# Patient Record
Sex: MALE | Race: BLACK OR AFRICAN AMERICAN | NOT HISPANIC OR LATINO | Employment: FULL TIME | URBAN - METROPOLITAN AREA
[De-identification: names, ages, dates, MRNs, and addresses within clinical notes are randomized per-mention and may not be internally consistent; named-entity substitution may affect disease eponyms.]

---

## 2018-02-24 ENCOUNTER — APPOINTMENT (OUTPATIENT)
Dept: CT IMAGING | Facility: HOSPITAL | Age: 23
End: 2018-02-24

## 2018-02-24 ENCOUNTER — HOSPITAL ENCOUNTER (EMERGENCY)
Facility: HOSPITAL | Age: 23
Discharge: HOME | End: 2018-02-24
Attending: EMERGENCY MEDICINE | Admitting: EMERGENCY MEDICINE

## 2018-02-24 VITALS
RESPIRATION RATE: 18 BRPM | DIASTOLIC BLOOD PRESSURE: 89 MMHG | OXYGEN SATURATION: 99 % | HEART RATE: 62 BPM | TEMPERATURE: 98.4 F | BODY MASS INDEX: 24.01 KG/M2 | HEIGHT: 67 IN | WEIGHT: 153 LBS | SYSTOLIC BLOOD PRESSURE: 130 MMHG

## 2018-02-24 DIAGNOSIS — R11.2 NAUSEA VOMITING AND DIARRHEA: ICD-10-CM

## 2018-02-24 DIAGNOSIS — R10.30 LOWER ABDOMINAL PAIN: ICD-10-CM

## 2018-02-24 DIAGNOSIS — K52.9 COLITIS: Primary | ICD-10-CM

## 2018-02-24 DIAGNOSIS — R19.7 NAUSEA VOMITING AND DIARRHEA: ICD-10-CM

## 2018-02-24 LAB
ALBUMIN SERPL-MCNC: 4.5 G/DL (ref 3.2–4.8)
ALBUMIN/GLOB SERPL: 1.3 G/DL (ref 1.5–2.5)
ALP SERPL-CCNC: 65 U/L (ref 25–100)
ALT SERPL W P-5'-P-CCNC: 18 U/L (ref 7–40)
ANION GAP SERPL CALCULATED.3IONS-SCNC: 9 MMOL/L (ref 3–11)
AST SERPL-CCNC: 20 U/L (ref 0–33)
BACTERIA UR QL AUTO: ABNORMAL /HPF
BASOPHILS # BLD MANUAL: 0 10*3/MM3 (ref 0–0.2)
BASOPHILS NFR BLD AUTO: 0 % (ref 0–1)
BILIRUB SERPL-MCNC: 0.6 MG/DL (ref 0.3–1.2)
BILIRUB UR QL STRIP: NEGATIVE
BUN BLD-MCNC: 11 MG/DL (ref 9–23)
BUN/CREAT SERPL: 12.2 (ref 7–25)
CALCIUM SPEC-SCNC: 9.6 MG/DL (ref 8.7–10.4)
CHLORIDE SERPL-SCNC: 100 MMOL/L (ref 99–109)
CLARITY UR: CLEAR
CO2 SERPL-SCNC: 28 MMOL/L (ref 20–31)
COLOR UR: YELLOW
CREAT BLD-MCNC: 0.9 MG/DL (ref 0.6–1.3)
DEPRECATED RDW RBC AUTO: 41.4 FL (ref 37–54)
EOSINOPHIL # BLD MANUAL: 0.11 10*3/MM3 (ref 0.1–0.3)
EOSINOPHIL NFR BLD MANUAL: 1 % (ref 0–3)
ERYTHROCYTE [DISTWIDTH] IN BLOOD BY AUTOMATED COUNT: 12.9 % (ref 11.3–14.5)
FLUAV AG NPH QL: NEGATIVE
FLUBV AG NPH QL IA: NEGATIVE
GFR SERPL CREATININE-BSD FRML MDRD: 130 ML/MIN/1.73
GLOBULIN UR ELPH-MCNC: 3.4 GM/DL
GLUCOSE BLD-MCNC: 111 MG/DL (ref 70–100)
GLUCOSE UR STRIP-MCNC: NEGATIVE MG/DL
HCT VFR BLD AUTO: 45 % (ref 38.9–50.9)
HGB BLD-MCNC: 15.4 G/DL (ref 13.1–17.5)
HGB UR QL STRIP.AUTO: ABNORMAL
HOLD SPECIMEN: NORMAL
HOLD SPECIMEN: NORMAL
HYALINE CASTS UR QL AUTO: ABNORMAL /LPF
KETONES UR QL STRIP: ABNORMAL
LEUKOCYTE ESTERASE UR QL STRIP.AUTO: NEGATIVE
LIPASE SERPL-CCNC: 19 U/L (ref 6–51)
LYMPHOCYTES # BLD MANUAL: 0.46 10*3/MM3 (ref 0.6–4.8)
LYMPHOCYTES NFR BLD MANUAL: 19 % (ref 0–12)
LYMPHOCYTES NFR BLD MANUAL: 4 % (ref 24–44)
MCH RBC QN AUTO: 30.5 PG (ref 27–31)
MCHC RBC AUTO-ENTMCNC: 34.2 G/DL (ref 32–36)
MCV RBC AUTO: 89.1 FL (ref 80–99)
METAMYELOCYTES NFR BLD MANUAL: 1 % (ref 0–0)
MONOCYTES # BLD AUTO: 2.18 10*3/MM3 (ref 0–1)
MYELOCYTES NFR BLD MANUAL: 1 % (ref 0–0)
NEUTROPHILS # BLD AUTO: 8.14 10*3/MM3 (ref 1.5–8.3)
NEUTROPHILS NFR BLD MANUAL: 29 % (ref 41–71)
NEUTS BAND NFR BLD MANUAL: 42 % (ref 0–5)
NITRITE UR QL STRIP: NEGATIVE
PH UR STRIP.AUTO: 6 [PH] (ref 5–8)
PLAT MORPH BLD: NORMAL
PLATELET # BLD AUTO: 221 10*3/MM3 (ref 150–450)
PMV BLD AUTO: 11 FL (ref 6–12)
POTASSIUM BLD-SCNC: 3.7 MMOL/L (ref 3.5–5.5)
PROT SERPL-MCNC: 7.9 G/DL (ref 5.7–8.2)
PROT UR QL STRIP: ABNORMAL
RBC # BLD AUTO: 5.05 10*6/MM3 (ref 4.2–5.76)
RBC # UR: ABNORMAL /HPF
RBC MORPH BLD: NORMAL
REF LAB TEST METHOD: ABNORMAL
SODIUM BLD-SCNC: 137 MMOL/L (ref 132–146)
SP GR UR STRIP: 1.06 (ref 1–1.03)
SQUAMOUS #/AREA URNS HPF: ABNORMAL /HPF
UROBILINOGEN UR QL STRIP: ABNORMAL
VARIANT LYMPHS NFR BLD MANUAL: 3 % (ref 0–5)
WBC MORPH BLD: NORMAL
WBC NRBC COR # BLD: 11.47 10*3/MM3 (ref 4.5–13.5)
WBC UR QL AUTO: ABNORMAL /HPF
WHOLE BLOOD HOLD SPECIMEN: NORMAL
WHOLE BLOOD HOLD SPECIMEN: NORMAL

## 2018-02-24 PROCEDURE — 0 IOPAMIDOL 61 % SOLUTION: Performed by: EMERGENCY MEDICINE

## 2018-02-24 PROCEDURE — 96374 THER/PROPH/DIAG INJ IV PUSH: CPT

## 2018-02-24 PROCEDURE — 96361 HYDRATE IV INFUSION ADD-ON: CPT

## 2018-02-24 PROCEDURE — 25010000002 ONDANSETRON PER 1 MG: Performed by: PHYSICIAN ASSISTANT

## 2018-02-24 PROCEDURE — 25010000002 KETOROLAC TROMETHAMINE PER 15 MG: Performed by: PHYSICIAN ASSISTANT

## 2018-02-24 PROCEDURE — 87804 INFLUENZA ASSAY W/OPTIC: CPT | Performed by: PHYSICIAN ASSISTANT

## 2018-02-24 PROCEDURE — 85007 BL SMEAR W/DIFF WBC COUNT: CPT | Performed by: EMERGENCY MEDICINE

## 2018-02-24 PROCEDURE — 99283 EMERGENCY DEPT VISIT LOW MDM: CPT

## 2018-02-24 PROCEDURE — 74177 CT ABD & PELVIS W/CONTRAST: CPT

## 2018-02-24 PROCEDURE — 83690 ASSAY OF LIPASE: CPT | Performed by: EMERGENCY MEDICINE

## 2018-02-24 PROCEDURE — 80053 COMPREHEN METABOLIC PANEL: CPT | Performed by: EMERGENCY MEDICINE

## 2018-02-24 PROCEDURE — 81001 URINALYSIS AUTO W/SCOPE: CPT | Performed by: EMERGENCY MEDICINE

## 2018-02-24 PROCEDURE — 85025 COMPLETE CBC W/AUTO DIFF WBC: CPT | Performed by: EMERGENCY MEDICINE

## 2018-02-24 PROCEDURE — 96375 TX/PRO/DX INJ NEW DRUG ADDON: CPT

## 2018-02-24 RX ORDER — METHYLPREDNISOLONE 4 MG/1
TABLET ORAL
Qty: 21 TABLET | Refills: 0 | OUTPATIENT
Start: 2018-02-24 | End: 2018-11-17

## 2018-02-24 RX ORDER — FAMOTIDINE 10 MG/ML
20 INJECTION, SOLUTION INTRAVENOUS ONCE
Status: COMPLETED | OUTPATIENT
Start: 2018-02-24 | End: 2018-02-24

## 2018-02-24 RX ORDER — SODIUM CHLORIDE 0.9 % (FLUSH) 0.9 %
10 SYRINGE (ML) INJECTION AS NEEDED
Status: DISCONTINUED | OUTPATIENT
Start: 2018-02-24 | End: 2018-02-24 | Stop reason: HOSPADM

## 2018-02-24 RX ORDER — METRONIDAZOLE 500 MG/1
500 TABLET ORAL 2 TIMES DAILY
Qty: 20 TABLET | Refills: 0 | Status: SHIPPED | OUTPATIENT
Start: 2018-02-24 | End: 2018-03-06

## 2018-02-24 RX ORDER — ONDANSETRON 4 MG/1
4 TABLET, ORALLY DISINTEGRATING ORAL EVERY 6 HOURS PRN
Qty: 15 TABLET | Refills: 0 | OUTPATIENT
Start: 2018-02-24 | End: 2020-09-02

## 2018-02-24 RX ORDER — CIPROFLOXACIN 500 MG/1
500 TABLET, FILM COATED ORAL 2 TIMES DAILY
Qty: 20 TABLET | Refills: 0 | Status: SHIPPED | OUTPATIENT
Start: 2018-02-24 | End: 2018-03-06

## 2018-02-24 RX ORDER — KETOROLAC TROMETHAMINE 30 MG/ML
15 INJECTION, SOLUTION INTRAMUSCULAR; INTRAVENOUS ONCE
Status: COMPLETED | OUTPATIENT
Start: 2018-02-24 | End: 2018-02-24

## 2018-02-24 RX ORDER — DICYCLOMINE HCL 20 MG
20 TABLET ORAL EVERY 6 HOURS PRN
Qty: 20 TABLET | Refills: 0 | Status: SHIPPED | OUTPATIENT
Start: 2018-02-24 | End: 2018-03-01

## 2018-02-24 RX ORDER — ONDANSETRON 2 MG/ML
4 INJECTION INTRAMUSCULAR; INTRAVENOUS ONCE
Status: COMPLETED | OUTPATIENT
Start: 2018-02-24 | End: 2018-02-24

## 2018-02-24 RX ADMIN — SODIUM CHLORIDE 1000 ML: 9 INJECTION, SOLUTION INTRAVENOUS at 13:59

## 2018-02-24 RX ADMIN — KETOROLAC TROMETHAMINE 15 MG: 30 INJECTION, SOLUTION INTRAMUSCULAR at 14:01

## 2018-02-24 RX ADMIN — ONDANSETRON HYDROCHLORIDE 4 MG: 2 INJECTION, SOLUTION INTRAMUSCULAR; INTRAVENOUS at 14:00

## 2018-02-24 RX ADMIN — FAMOTIDINE 20 MG: 10 INJECTION, SOLUTION INTRAVENOUS at 14:03

## 2018-02-24 RX ADMIN — SODIUM CHLORIDE 1000 ML: 9 INJECTION, SOLUTION INTRAVENOUS at 15:26

## 2018-02-24 RX ADMIN — IOPAMIDOL 85 ML: 612 INJECTION, SOLUTION INTRAVENOUS at 14:36

## 2018-02-24 NOTE — ED PROVIDER NOTES
Subjective   HPI Comments: Pt is a 23 yo male presenting to ED with vomiting, diarrhea, and abdominal pain. He explains 3 days ago began with headache, chills, sore throat, congestion, diarrhea and vomiting. The chills and HA resolved but the diarrhea has persisted. He tried Zofran and Imodium with no relief. He was seen at  twice and dx with the Flu but was not given Tamiflu due to his current diarrhea. They also did not test him for the Flu. He is having left lower abdominal cramping. He has had a few episodes of bright red blood in his stool. He denies prior GI hx and denies recent antibiotics. He denies cough or SOB.     Patient is a 22 y.o. male presenting with diarrhea.   History provided by:  Patient  Diarrhea   The primary symptoms include nausea, vomiting and diarrhea. Primary symptoms do not include fever, abdominal pain, dysuria, arthralgias or rash. Episode onset: 3 days ago.   The illness is also significant for chills. The illness does not include constipation or back pain. Associated medical issues do not include inflammatory bowel disease, GERD, liver disease, alcohol abuse, irritable bowel syndrome, hemorrhoids or diverticulitis.       Review of Systems   Constitutional: Positive for chills. Negative for fever.   HENT: Negative for congestion, ear pain, sore throat and trouble swallowing.    Eyes: Negative for pain, redness and visual disturbance.   Respiratory: Negative for cough and shortness of breath.    Cardiovascular: Negative for chest pain and leg swelling.   Gastrointestinal: Positive for diarrhea, nausea and vomiting. Negative for abdominal pain, blood in stool and constipation.   Genitourinary: Negative for difficulty urinating, dysuria and flank pain.   Musculoskeletal: Negative for arthralgias, back pain and joint swelling.   Skin: Negative.  Negative for rash and wound.   Allergic/Immunologic: Negative.    Neurological: Negative for dizziness, syncope, weakness, numbness and  headaches.   Psychiatric/Behavioral: Negative for confusion.   All other systems reviewed and are negative.      History reviewed. No pertinent past medical history.    No Known Allergies    History reviewed. No pertinent surgical history.    History reviewed. No pertinent family history.    Social History     Social History   • Marital status: Single     Spouse name: N/A   • Number of children: N/A   • Years of education: N/A     Social History Main Topics   • Smoking status: Never Smoker   • Smokeless tobacco: None   • Alcohol use No   • Drug use: No   • Sexual activity: Not Asked     Other Topics Concern   • None     Social History Narrative   • None           Objective   Physical Exam   Constitutional: He is oriented to person, place, and time. He appears well-developed and well-nourished.   HENT:   Head: Atraumatic.   Nose: Nose normal.   Eyes: Conjunctivae, EOM and lids are normal. Pupils are equal, round, and reactive to light.   Neck: Normal range of motion. Neck supple.   Cardiovascular: Normal rate, regular rhythm and normal heart sounds.    Pulmonary/Chest: Effort normal and breath sounds normal.   Abdominal: Soft. He exhibits no distension. There is tenderness in the left lower quadrant. There is no rebound, no guarding and no CVA tenderness.   Musculoskeletal: Normal range of motion. He exhibits no edema, tenderness or deformity.   Neurological: He is alert and oriented to person, place, and time. He has normal strength. No sensory deficit.   Skin: Skin is warm, dry and intact.   Psychiatric: He has a normal mood and affect. His behavior is normal.   Nursing note and vitals reviewed.      Procedures         ED Course  ED Course      Pt feeling much better after treatment in ED. Discussed results and treatment plan. Will cover with Cipro / Flagyl and short course of steroids due to the colitis and inflammation. Discussed close f/u with GI for further management. He is agreeable with plan. He will return  to ED if new or worse sx.     Recent Results (from the past 24 hour(s))   Comprehensive Metabolic Panel    Collection Time: 02/24/18  1:17 PM   Result Value Ref Range    Glucose 111 (H) 70 - 100 mg/dL    BUN 11 9 - 23 mg/dL    Creatinine 0.90 0.60 - 1.30 mg/dL    Sodium 137 132 - 146 mmol/L    Potassium 3.7 3.5 - 5.5 mmol/L    Chloride 100 99 - 109 mmol/L    CO2 28.0 20.0 - 31.0 mmol/L    Calcium 9.6 8.7 - 10.4 mg/dL    Total Protein 7.9 5.7 - 8.2 g/dL    Albumin 4.50 3.20 - 4.80 g/dL    ALT (SGPT) 18 7 - 40 U/L    AST (SGOT) 20 0 - 33 U/L    Alkaline Phosphatase 65 25 - 100 U/L    Total Bilirubin 0.6 0.3 - 1.2 mg/dL    eGFR  African Amer 130 >60 mL/min/1.73    Globulin 3.4 gm/dL    A/G Ratio 1.3 (L) 1.5 - 2.5 g/dL    BUN/Creatinine Ratio 12.2 7.0 - 25.0    Anion Gap 9.0 3.0 - 11.0 mmol/L   Lipase    Collection Time: 02/24/18  1:17 PM   Result Value Ref Range    Lipase 19 6 - 51 U/L   Light Blue Top    Collection Time: 02/24/18  1:17 PM   Result Value Ref Range    Extra Tube hold for add-on    Green Top (Gel)    Collection Time: 02/24/18  1:17 PM   Result Value Ref Range    Extra Tube Hold for add-ons.    Lavender Top    Collection Time: 02/24/18  1:17 PM   Result Value Ref Range    Extra Tube hold for add-on    Gold Top - SST    Collection Time: 02/24/18  1:17 PM   Result Value Ref Range    Extra Tube Hold for add-ons.    CBC Auto Differential    Collection Time: 02/24/18  1:17 PM   Result Value Ref Range    WBC 11.47 4.50 - 13.50 10*3/mm3    RBC 5.05 4.20 - 5.76 10*6/mm3    Hemoglobin 15.4 13.1 - 17.5 g/dL    Hematocrit 45.0 38.9 - 50.9 %    MCV 89.1 80.0 - 99.0 fL    MCH 30.5 27.0 - 31.0 pg    MCHC 34.2 32.0 - 36.0 g/dL    RDW 12.9 11.3 - 14.5 %    RDW-SD 41.4 37.0 - 54.0 fl    MPV 11.0 6.0 - 12.0 fL    Platelets 221 150 - 450 10*3/mm3   Manual Differential    Collection Time: 02/24/18  1:17 PM   Result Value Ref Range    Neutrophil % 29.0 (L) 41.0 - 71.0 %    Lymphocyte % 4.0 (L) 24.0 - 44.0 %    Monocyte % 19.0  (H) 0.0 - 12.0 %    Eosinophil % 1.0 0.0 - 3.0 %    Basophil % 0.0 0.0 - 1.0 %    Bands %  42.0 (H) 0.0 - 5.0 %    Metamyelocyte % 1.0 (H) 0.0 - 0.0 %    Myelocyte % 1.0 (H) 0.0 - 0.0 %    Atypical Lymphocyte % 3.0 0.0 - 5.0 %    Neutrophils Absolute 8.14 1.50 - 8.30 10*3/mm3    Lymphocytes Absolute 0.46 (L) 0.60 - 4.80 10*3/mm3    Monocytes Absolute 2.18 (H) 0.00 - 1.00 10*3/mm3    Eosinophils Absolute 0.11 0.10 - 0.30 10*3/mm3    Basophils Absolute 0.00 0.00 - 0.20 10*3/mm3    RBC Morphology Normal Normal    WBC Morphology Normal Normal    Platelet Morphology Normal Normal   Influenza Antigen, Rapid - Swab, Nasopharynx    Collection Time: 02/24/18  2:04 PM   Result Value Ref Range    Influenza A Ag, EIA Negative Negative    Influenza B Ag, EIA Negative Negative   Urinalysis With / Culture If Indicated - Urine, Clean Catch    Collection Time: 02/24/18  3:21 PM   Result Value Ref Range    Color, UA Yellow Yellow, Straw    Appearance, UA Clear Clear    pH, UA 6.0 5.0 - 8.0    Specific Gravity, UA 1.059 (H) 1.001 - 1.030    Glucose, UA Negative Negative    Ketones, UA 40 mg/dL (2+) (A) Negative    Bilirubin, UA Negative Negative    Blood, UA Trace (A) Negative    Protein, UA Trace (A) Negative    Leuk Esterase, UA Negative Negative    Nitrite, UA Negative Negative    Urobilinogen, UA 0.2 E.U./dL 0.2 - 1.0 E.U./dL   Urinalysis, Microscopic Only - Urine, Clean Catch    Collection Time: 02/24/18  3:21 PM   Result Value Ref Range    RBC, UA 3-6 (A) None Seen, 0-2 /HPF    WBC, UA None Seen None Seen, 0-2 /HPF    Bacteria, UA None Seen None Seen, Trace /HPF    Squamous Epithelial Cells, UA 0-2 None Seen, 0-2 /HPF    Hyaline Casts, UA 0-6 0 - 6 /LPF    Methodology Manual Light Microscopy      Note: In addition to lab results from this visit, the labs listed above may include labs taken at another facility or during a different encounter within the last 24 hours. Please correlate lab times with ED admission and discharge  "times for further clarification of the services performed during this visit.    CT Abdomen Pelvis With Contrast   Final Result   Circumferential mural thickening of the distal transverse   colon through the rectum in a fairly noninterrupted appearance   consistent with colitis. Given long segment involvement with adjacent   inflammation, considerations for infectious or inflammatory etiology   with ulcerative colitis top consideration. No associated abscess   formation or perforation.       DICTATED:     02/24/2018   EDITED    :     02/24/2018        This report was finalized on 2/24/2018 3:51 PM by Dr. Pete Nuñez.            Vitals:    02/24/18 1313   BP: (!) 150/105   Pulse: 60   Resp: 16   Temp: 98.5 °F (36.9 °C)   TempSrc: Oral   SpO2: 99%   Weight: 69.4 kg (153 lb)   Height: 170.2 cm (67\")     Medications   sodium chloride 0.9 % flush 10 mL (not administered)   sodium chloride 0.9 % bolus 1,000 mL (0 mL Intravenous Stopped 2/24/18 1504)   ondansetron (ZOFRAN) injection 4 mg (4 mg Intravenous Given 2/24/18 1400)   famotidine (PEPCID) injection 20 mg (20 mg Intravenous Given 2/24/18 1403)   ketorolac (TORADOL) injection 15 mg (15 mg Intravenous Given 2/24/18 1401)   iopamidol (ISOVUE-300) 61 % injection 85 mL (85 mL Intravenous Given 2/24/18 1436)   sodium chloride 0.9 % bolus 1,000 mL (1,000 mL Intravenous New Bag 2/24/18 1526)                      MDM    Final diagnoses:   Colitis   Nausea vomiting and diarrhea   Lower abdominal pain            CORIN Valadez  02/24/18 1901    "

## 2018-02-24 NOTE — DISCHARGE INSTRUCTIONS
Follow up with one of the [facility] physician groups below to setup primary care. If you have trouble following up, please call Anne James, our transitional care nurse, at [phone].    (Dr. Gallo, Dr. Marks, Dr. Lomeli, and Dr. Brooks.)  [facility], Primary Care, [phone], [address], [place] [address]    [facility], Primary Care, [phone], [address], [address] [place], [address] [facility]) [facility], Primary Care, [phone], [address] [place], [address] [facility], Primary Care, [phone], [address], [address] [place], [address]     [place] [address] [facility], Primary Care, [phone], [address], [address] [place], [address]    [place] [address] [facility], Primary Care, [phone], [address]    [place], [address] [facility], Primary Care, [phone], [address], [address] [place], [address] [facility], Primary Care, [phone], [address], [address] [place], [address] [facility]) [facility], Primary Care, [phone], [address], [address] [place], [address] [facility], Primary Care, [phone], [address], [address] [place], [address] [facility], Primary Care, [phone], [address], [place], [address]     [facility], Primary Care, [phone], [address] [id number]

## 2018-05-04 ENCOUNTER — HOSPITAL ENCOUNTER (EMERGENCY)
Facility: HOSPITAL | Age: 23
Discharge: HOME | End: 2018-05-05
Attending: EMERGENCY MEDICINE | Admitting: EMERGENCY MEDICINE

## 2018-05-04 VITALS
HEIGHT: 67 IN | WEIGHT: 149 LBS | TEMPERATURE: 98.7 F | RESPIRATION RATE: 16 BRPM | OXYGEN SATURATION: 97 % | SYSTOLIC BLOOD PRESSURE: 122 MMHG | DIASTOLIC BLOOD PRESSURE: 82 MMHG | BODY MASS INDEX: 23.39 KG/M2 | HEART RATE: 69 BPM

## 2018-05-04 DIAGNOSIS — R11.2 NON-INTRACTABLE VOMITING WITH NAUSEA, UNSPECIFIED VOMITING TYPE: ICD-10-CM

## 2018-05-04 DIAGNOSIS — R10.84 GENERALIZED ABDOMINAL PAIN: Primary | ICD-10-CM

## 2018-05-04 LAB
ALBUMIN SERPL-MCNC: 4.3 G/DL (ref 3.2–4.8)
ALBUMIN/GLOB SERPL: 1.4 G/DL (ref 1.5–2.5)
ALP SERPL-CCNC: 76 U/L (ref 25–100)
ALT SERPL W P-5'-P-CCNC: 15 U/L (ref 7–40)
ANION GAP SERPL CALCULATED.3IONS-SCNC: 5 MMOL/L (ref 3–11)
AST SERPL-CCNC: 19 U/L (ref 0–33)
BASOPHILS # BLD AUTO: 0.02 10*3/MM3 (ref 0–0.2)
BASOPHILS NFR BLD AUTO: 0.2 % (ref 0–1)
BILIRUB SERPL-MCNC: 0.9 MG/DL (ref 0.3–1.2)
BUN BLD-MCNC: 10 MG/DL (ref 9–23)
BUN/CREAT SERPL: 12.5 (ref 7–25)
CALCIUM SPEC-SCNC: 8.9 MG/DL (ref 8.7–10.4)
CHLORIDE SERPL-SCNC: 111 MMOL/L (ref 99–109)
CO2 SERPL-SCNC: 25 MMOL/L (ref 20–31)
CREAT BLD-MCNC: 0.8 MG/DL (ref 0.6–1.3)
DEPRECATED RDW RBC AUTO: 44.3 FL (ref 37–54)
EOSINOPHIL # BLD AUTO: 0.27 10*3/MM3 (ref 0–0.3)
EOSINOPHIL NFR BLD AUTO: 3.2 % (ref 0–3)
ERYTHROCYTE [DISTWIDTH] IN BLOOD BY AUTOMATED COUNT: 13.6 % (ref 11.3–14.5)
GFR SERPL CREATININE-BSD FRML MDRD: 149 ML/MIN/1.73
GLOBULIN UR ELPH-MCNC: 3 GM/DL
GLUCOSE BLD-MCNC: 84 MG/DL (ref 70–100)
HCT VFR BLD AUTO: 41.9 % (ref 38.9–50.9)
HGB BLD-MCNC: 13.9 G/DL (ref 13.1–17.5)
HOLD SPECIMEN: NORMAL
HOLD SPECIMEN: NORMAL
IMM GRANULOCYTES # BLD: 0.02 10*3/MM3 (ref 0–0.03)
IMM GRANULOCYTES NFR BLD: 0.2 % (ref 0–0.6)
LIPASE SERPL-CCNC: 28 U/L (ref 6–51)
LYMPHOCYTES # BLD AUTO: 1.81 10*3/MM3 (ref 0.6–4.8)
LYMPHOCYTES NFR BLD AUTO: 21.1 % (ref 24–44)
MCH RBC QN AUTO: 29.7 PG (ref 27–31)
MCHC RBC AUTO-ENTMCNC: 33.2 G/DL (ref 32–36)
MCV RBC AUTO: 89.5 FL (ref 80–99)
MONOCYTES # BLD AUTO: 1.04 10*3/MM3 (ref 0–1)
MONOCYTES NFR BLD AUTO: 12.1 % (ref 0–12)
NEUTROPHILS # BLD AUTO: 5.42 10*3/MM3 (ref 1.5–8.3)
NEUTROPHILS NFR BLD AUTO: 63.4 % (ref 41–71)
PLATELET # BLD AUTO: 251 10*3/MM3 (ref 150–450)
PMV BLD AUTO: 10.5 FL (ref 6–12)
POTASSIUM BLD-SCNC: 3.4 MMOL/L (ref 3.5–5.5)
PROT SERPL-MCNC: 7.3 G/DL (ref 5.7–8.2)
RBC # BLD AUTO: 4.68 10*6/MM3 (ref 4.2–5.76)
SODIUM BLD-SCNC: 141 MMOL/L (ref 132–146)
WBC NRBC COR # BLD: 8.56 10*3/MM3 (ref 4.5–13.5)
WHOLE BLOOD HOLD SPECIMEN: NORMAL
WHOLE BLOOD HOLD SPECIMEN: NORMAL

## 2018-05-04 PROCEDURE — 85025 COMPLETE CBC W/AUTO DIFF WBC: CPT

## 2018-05-04 PROCEDURE — 80053 COMPREHEN METABOLIC PANEL: CPT

## 2018-05-04 PROCEDURE — 99283 EMERGENCY DEPT VISIT LOW MDM: CPT

## 2018-05-04 PROCEDURE — 83690 ASSAY OF LIPASE: CPT

## 2018-05-04 PROCEDURE — 81003 URINALYSIS AUTO W/O SCOPE: CPT | Performed by: EMERGENCY MEDICINE

## 2018-05-04 PROCEDURE — 96360 HYDRATION IV INFUSION INIT: CPT

## 2018-05-04 RX ORDER — SODIUM CHLORIDE 0.9 % (FLUSH) 0.9 %
10 SYRINGE (ML) INJECTION AS NEEDED
Status: DISCONTINUED | OUTPATIENT
Start: 2018-05-04 | End: 2018-05-05 | Stop reason: HOSPADM

## 2018-05-05 ENCOUNTER — APPOINTMENT (OUTPATIENT)
Dept: CT IMAGING | Facility: HOSPITAL | Age: 23
End: 2018-05-05

## 2018-05-05 LAB
BILIRUB UR QL STRIP: NEGATIVE
CLARITY UR: CLEAR
COLOR UR: YELLOW
GLUCOSE UR STRIP-MCNC: NEGATIVE MG/DL
HGB UR QL STRIP.AUTO: NEGATIVE
KETONES UR QL STRIP: ABNORMAL
LEUKOCYTE ESTERASE UR QL STRIP.AUTO: NEGATIVE
NITRITE UR QL STRIP: NEGATIVE
PH UR STRIP.AUTO: 5.5 [PH] (ref 5–8)
PROT UR QL STRIP: NEGATIVE
SP GR UR STRIP: >=1.03 (ref 1–1.03)
UROBILINOGEN UR QL STRIP: ABNORMAL

## 2018-05-05 PROCEDURE — 74177 CT ABD & PELVIS W/CONTRAST: CPT

## 2018-05-05 PROCEDURE — 96360 HYDRATION IV INFUSION INIT: CPT

## 2018-05-05 PROCEDURE — 25010000002 IOPAMIDOL 61 % SOLUTION: Performed by: EMERGENCY MEDICINE

## 2018-05-05 RX ORDER — DICYCLOMINE HCL 20 MG
20 TABLET ORAL EVERY 6 HOURS PRN
Qty: 12 TABLET | Refills: 0 | OUTPATIENT
Start: 2018-05-05 | End: 2021-01-19

## 2018-05-05 RX ORDER — OMEPRAZOLE 40 MG/1
40 CAPSULE, DELAYED RELEASE ORAL DAILY
Qty: 30 CAPSULE | Refills: 0 | Status: SHIPPED | OUTPATIENT
Start: 2018-05-05

## 2018-05-05 RX ADMIN — SODIUM CHLORIDE 1000 ML: 9 INJECTION, SOLUTION INTRAVENOUS at 00:17

## 2018-05-05 RX ADMIN — IOPAMIDOL 85 ML: 612 INJECTION, SOLUTION INTRAVENOUS at 00:34

## 2018-05-05 NOTE — ED PROVIDER NOTES
"Subjective   Mr. Willy Vu is a 22 year old male who presents to the ED with c/o abd pain. The patient reports that he has experienced pain in his mid-lower abdomen for the past 4 days. He describes the sensation as a \"fullness\" and notes that it does not radiate. He denies any associated fever, chills, or urinary symptoms. Had a normal BM this morning. Vomited once today. Of note, patient was diagnosed with syphilis 2 weeks ago by his primary care physician. Had treatment a week ago and his symptoms have since resolved. All other STIs negative. Has had no sexual activity since then. Additionally, he has a colonoscopy scheduled in June for severe abd pain.        History provided by:  Patient  Abdominal Pain   Pain location:  Periumbilical  Pain quality: fullness    Pain radiates to:  Does not radiate  Pain severity:  Moderate  Duration:  4 days  Timing:  Constant  Progression:  Unchanged  Chronicity:  New  Relieved by:  Nothing  Worsened by:  Nothing  Associated symptoms: vomiting    Associated symptoms: no chills, no constipation, no diarrhea, no dysuria, no fever and no hematuria    Vomiting:     Number of occurrences:  1  Risk factors: not elderly and not obese    Risk factors comment:  Hx of STI      Review of Systems   Constitutional: Negative for chills and fever.   Gastrointestinal: Positive for abdominal pain and vomiting. Negative for blood in stool, constipation and diarrhea.   Genitourinary: Negative.  Negative for decreased urine volume, difficulty urinating, dysuria, frequency, hematuria and urgency.   All other systems reviewed and are negative.      No past medical history on file.    No Known Allergies    No past surgical history on file.    No family history on file.    Social History     Social History   • Marital status: Single     Social History Main Topics   • Smoking status: Never Smoker   • Alcohol use No   • Drug use: No     Other Topics Concern   • Not on file         Objective "   Physical Exam   Constitutional: He is oriented to person, place, and time. He appears well-developed and well-nourished. No distress.   HENT:   Head: Normocephalic and atraumatic.   Eyes: Conjunctivae are normal. No scleral icterus.   Neck: Normal range of motion. Neck supple.   Cardiovascular: Normal rate, regular rhythm and normal heart sounds.  Exam reveals no gallop and no friction rub.    No murmur heard.  Pulmonary/Chest: Effort normal and breath sounds normal. No respiratory distress. He has no wheezes. He has no rales.   Abdominal: Soft. Bowel sounds are normal. There is tenderness. There is no rebound and no guarding.   Mild generalized upper abdominal tenderness. Mild RLQ tenderness. No guarding. No rebound.   Musculoskeletal: Normal range of motion.   Neurological: He is alert and oriented to person, place, and time.   Skin: Skin is warm and dry. He is not diaphoretic.   Psychiatric: He has a normal mood and affect. His behavior is normal.   Nursing note and vitals reviewed.      Procedures         ED Course  ED Course   Value Comment By Time   Monocytes, Absolute: (!) 1.04 (Reviewed) Mau Almanzar DO 05/04 4871    I reviewed imaging and laboratory studies with the patient.  In discussing treatment plan, patient states that he remembers that he started taking the medication just before his abdominal pain symptoms began.  He is unsure exactly what the name is but he states that it was a prophylactic HIV medication that he just started taking.  He states that he does not have HIV, but his boyfriend has HIV, and he has been prescribed this medicine occasion prophylactically. Mau Almanzar DO 05/05 6886       Recent Results (from the past 24 hour(s))   Comprehensive Metabolic Panel    Collection Time: 05/04/18 10:12 PM   Result Value Ref Range    Glucose 84 70 - 100 mg/dL    BUN 10 9 - 23 mg/dL    Creatinine 0.80 0.60 - 1.30 mg/dL    Sodium 141 132 - 146 mmol/L    Potassium 3.4 (L) 3.5 - 5.5 mmol/L    Chloride  111 (H) 99 - 109 mmol/L    CO2 25.0 20.0 - 31.0 mmol/L    Calcium 8.9 8.7 - 10.4 mg/dL    Total Protein 7.3 5.7 - 8.2 g/dL    Albumin 4.30 3.20 - 4.80 g/dL    ALT (SGPT) 15 7 - 40 U/L    AST (SGOT) 19 0 - 33 U/L    Alkaline Phosphatase 76 25 - 100 U/L    Total Bilirubin 0.9 0.3 - 1.2 mg/dL    eGFR  African Amer 149 >60 mL/min/1.73    Globulin 3.0 gm/dL    A/G Ratio 1.4 (L) 1.5 - 2.5 g/dL    BUN/Creatinine Ratio 12.5 7.0 - 25.0    Anion Gap 5.0 3.0 - 11.0 mmol/L   Lipase    Collection Time: 05/04/18 10:12 PM   Result Value Ref Range    Lipase 28 6 - 51 U/L   Light Blue Top    Collection Time: 05/04/18 10:12 PM   Result Value Ref Range    Extra Tube hold for add-on    Green Top (Gel)    Collection Time: 05/04/18 10:12 PM   Result Value Ref Range    Extra Tube Hold for add-ons.    Lavender Top    Collection Time: 05/04/18 10:12 PM   Result Value Ref Range    Extra Tube hold for add-on    Gold Top - SST    Collection Time: 05/04/18 10:12 PM   Result Value Ref Range    Extra Tube Hold for add-ons.    CBC Auto Differential    Collection Time: 05/04/18 10:12 PM   Result Value Ref Range    WBC 8.56 4.50 - 13.50 10*3/mm3    RBC 4.68 4.20 - 5.76 10*6/mm3    Hemoglobin 13.9 13.1 - 17.5 g/dL    Hematocrit 41.9 38.9 - 50.9 %    MCV 89.5 80.0 - 99.0 fL    MCH 29.7 27.0 - 31.0 pg    MCHC 33.2 32.0 - 36.0 g/dL    RDW 13.6 11.3 - 14.5 %    RDW-SD 44.3 37.0 - 54.0 fl    MPV 10.5 6.0 - 12.0 fL    Platelets 251 150 - 450 10*3/mm3    Neutrophil % 63.4 41.0 - 71.0 %    Lymphocyte % 21.1 (L) 24.0 - 44.0 %    Monocyte % 12.1 (H) 0.0 - 12.0 %    Eosinophil % 3.2 (H) 0.0 - 3.0 %    Basophil % 0.2 0.0 - 1.0 %    Immature Grans % 0.2 0.0 - 0.6 %    Neutrophils, Absolute 5.42 1.50 - 8.30 10*3/mm3    Lymphocytes, Absolute 1.81 0.60 - 4.80 10*3/mm3    Monocytes, Absolute 1.04 (H) 0.00 - 1.00 10*3/mm3    Eosinophils, Absolute 0.27 0.00 - 0.30 10*3/mm3    Basophils, Absolute 0.02 0.00 - 0.20 10*3/mm3    Immature Grans, Absolute 0.02 0.00 - 0.03  "10*3/mm3     Note: In addition to lab results from this visit, the labs listed above may include labs taken at another facility or during a different encounter within the last 24 hours. Please correlate lab times with ED admission and discharge times for further clarification of the services performed during this visit.    CT Abdomen Pelvis With Contrast    (Results Pending)     Vitals:    05/04/18 2211   BP: 122/82   BP Location: Left arm   Patient Position: Sitting   Pulse: 69   Resp: 16   Temp: 98.7 °F (37.1 °C)   TempSrc: Oral   SpO2: 97%   Weight: 67.6 kg (149 lb)   Height: 170.2 cm (67\")     Medications   sodium chloride 0.9 % flush 10 mL (not administered)   sodium chloride 0.9 % bolus 1,000 mL (not administered)     ECG/EMG Results (last 24 hours)     ** No results found for the last 24 hours. **        Upon further discussion, pt states that 1-2 days before his abdominal pain began, he started taking an HIV prophylaxis medication.  He denies that he has a history of HIV, but his boyfriend has HIV, and thus pt has been taking a prophylactic antiviral medication of which he does not know the name.  Given the timing of his symptoms, I suspect that this new medication could be contributing to his abdominal pain.  He will discuss this with his PCP, the prescribing physician.               MDM    Final diagnoses:   Generalized abdominal pain   Non-intractable vomiting with nausea, unspecified vomiting type       Documentation assistance provided by keyla Hernández.  Information recorded by the scribe was done at my direction and has been verified and validated by me.     Tima Hernández  05/04/18 5090       Mau Almanzar DO  05/05/18 3935    "

## 2018-11-17 ENCOUNTER — HOSPITAL ENCOUNTER (EMERGENCY)
Facility: HOSPITAL | Age: 23
Discharge: HOME | End: 2018-11-17
Attending: EMERGENCY MEDICINE | Admitting: EMERGENCY MEDICINE

## 2018-11-17 VITALS
DIASTOLIC BLOOD PRESSURE: 72 MMHG | BODY MASS INDEX: 22.76 KG/M2 | RESPIRATION RATE: 16 BRPM | SYSTOLIC BLOOD PRESSURE: 120 MMHG | OXYGEN SATURATION: 98 % | HEIGHT: 67 IN | WEIGHT: 145 LBS | TEMPERATURE: 98.2 F | HEART RATE: 60 BPM

## 2018-11-17 DIAGNOSIS — N34.2 URETHRITIS: Primary | ICD-10-CM

## 2018-11-17 PROCEDURE — 86592 SYPHILIS TEST NON-TREP QUAL: CPT | Performed by: EMERGENCY MEDICINE

## 2018-11-17 PROCEDURE — 96372 THER/PROPH/DIAG INJ SC/IM: CPT

## 2018-11-17 PROCEDURE — 87491 CHLMYD TRACH DNA AMP PROBE: CPT | Performed by: EMERGENCY MEDICINE

## 2018-11-17 PROCEDURE — 25010000002 CEFTRIAXONE PER 250 MG: Performed by: EMERGENCY MEDICINE

## 2018-11-17 PROCEDURE — 99283 EMERGENCY DEPT VISIT LOW MDM: CPT

## 2018-11-17 PROCEDURE — 87591 N.GONORRHOEAE DNA AMP PROB: CPT | Performed by: EMERGENCY MEDICINE

## 2018-11-17 RX ORDER — LIDOCAINE HYDROCHLORIDE 10 MG/ML
0.9 INJECTION, SOLUTION EPIDURAL; INFILTRATION; INTRACAUDAL; PERINEURAL ONCE
Status: COMPLETED | OUTPATIENT
Start: 2018-11-17 | End: 2018-11-17

## 2018-11-17 RX ORDER — CEFTRIAXONE SODIUM 250 MG/1
250 INJECTION, POWDER, FOR SOLUTION INTRAMUSCULAR; INTRAVENOUS ONCE
Status: COMPLETED | OUTPATIENT
Start: 2018-11-17 | End: 2018-11-17

## 2018-11-17 RX ORDER — AZITHROMYCIN 250 MG/1
1000 TABLET, FILM COATED ORAL ONCE
Status: COMPLETED | OUTPATIENT
Start: 2018-11-17 | End: 2018-11-17

## 2018-11-17 RX ORDER — ONDANSETRON 4 MG/1
4 TABLET, ORALLY DISINTEGRATING ORAL ONCE
Status: COMPLETED | OUTPATIENT
Start: 2018-11-17 | End: 2018-11-17

## 2018-11-17 RX ADMIN — LIDOCAINE HYDROCHLORIDE 0.9 ML: 10 INJECTION, SOLUTION EPIDURAL; INFILTRATION; INTRACAUDAL; PERINEURAL at 01:52

## 2018-11-17 RX ADMIN — CEFTRIAXONE SODIUM 250 MG: 250 INJECTION, POWDER, FOR SOLUTION INTRAMUSCULAR; INTRAVENOUS at 01:52

## 2018-11-17 RX ADMIN — AZITHROMYCIN 1000 MG: 250 TABLET, FILM COATED ORAL at 01:49

## 2018-11-17 RX ADMIN — ONDANSETRON 4 MG: 4 TABLET, ORALLY DISINTEGRATING ORAL at 01:50

## 2018-11-17 NOTE — DISCHARGE INSTRUCTIONS
No sexual contact until this infection is fully treated and diagnosed.  Your doctor will give you further guidance on reevaluation this coming week    You've been treated with ceftriaxone and azithromycin here in the emergency department.  An RPR which is a syphilis test was done, will but will take several days to receive results.    If she results indicate you have an STD your partner will absolutely need to be treated.  Your partner needs to be evaluated regardless based on your symptoms by his regular physician    Follow-up with your doctor this coming week for HIV testing, and have her obtain the results of your RPR here as well as if it's positive you'll need additional different antibiotics.    Return to the emergency department if you have any acute, urgent, emergent, or progressive symptoms as discussed.  Follow-up with urology as needed in consultation with your primary physician if you have continued symptoms

## 2018-11-17 NOTE — ED PROVIDER NOTES
Subjective   22-year-old male presents to emergency department with urethral pain and discharge    Patient reports onset of purulent urethral discharge this afternoon along with dysuria without frequency or urgency.  He's had no STDs in the past.  He reports that his male sexual partner is HIV positive with a history of gonorrhea and chlamydia in the past.  He reports he's had no other contacts other than his significant other for the last 6 months.  He denies any inguinal pain or adenopathy.  He denies any abdominal pain nausea vomiting diarrhea.  He has no sore throat or pharyngeal symptoms.  He denies any skin rash.  He has no cough congestion URI symptoms and denies any pain other than urethral.  He gets checked regularly for HIV, and all checks have been negative.          History provided by:  Patient  Illness   Location:  Penis  Quality:  Burning and discharge  Severity:  Moderate  Onset quality:  Sudden  Timing:  Constant  Progression:  Worsening  Chronicity:  New  Relieved by:  Nothing  Worsened by:  Voiding  Associated symptoms: no abdominal pain, no congestion, no cough, no diarrhea, no ear pain, no fatigue, no fever, no headaches, no rash, no rhinorrhea, no sore throat and no vomiting    Risk factors:  Sexually active      Review of Systems   Constitutional: Negative.  Negative for fatigue and fever.   HENT: Negative.  Negative for congestion, ear pain, rhinorrhea and sore throat.    Eyes: Negative.    Respiratory: Negative.  Negative for cough.    Cardiovascular: Negative.    Gastrointestinal: Negative.  Negative for abdominal pain, diarrhea and vomiting.   Genitourinary: Positive for discharge, dysuria and penile pain. Negative for penile swelling, scrotal swelling and testicular pain.   Skin: Negative.  Negative for rash.   Neurological: Negative.  Negative for headaches.   All other systems reviewed and are negative.      No past medical history on file.    No Known Allergies    No past surgical  history on file.    No family history on file.    Social History     Socioeconomic History   • Marital status: Single     Spouse name: Not on file   • Number of children: Not on file   • Years of education: Not on file   • Highest education level: Not on file   Tobacco Use   • Smoking status: Never Smoker   • Smokeless tobacco: Never Used   Substance and Sexual Activity   • Alcohol use: No   • Drug use: No   • Sexual activity: Defer           Objective   Physical Exam   Constitutional: No distress.   HENT:   Head: Normocephalic and atraumatic.   Nose: Nose normal.   Mouth/Throat: Oropharynx is clear and moist. No oropharyngeal exudate.   No oropharyngeal exudate or lesions erythema or abnormalities.   Eyes: Conjunctivae are normal. Right eye exhibits no discharge. Left eye exhibits no discharge.   Neck: Normal range of motion. Neck supple.   Cardiovascular: Normal rate, regular rhythm, normal heart sounds and intact distal pulses.   Pulmonary/Chest: Effort normal and breath sounds normal. No respiratory distress. He has no wheezes. He has no rales. He exhibits no tenderness.   Abdominal: Soft. Bowel sounds are normal. He exhibits no distension. There is no tenderness.   Genitourinary: No penile tenderness.   Genitourinary Comments: No inguinal adenopathy or tenderness.  No skin lesions erosive for nonaggressive.  Purulent discharge from the urethral orifice.   Musculoskeletal: Normal range of motion.   Lymphadenopathy:     He has no cervical adenopathy.   Neurological: He is alert.   Skin: Skin is warm and dry.   Psychiatric: He has a normal mood and affect.   Nursing note and vitals reviewed.      Procedures           ED Course  ED Course as of Nov 17 0146   Sat Nov 17, 2018   0139 Penile swab for GC and chlamydia are obtained RPR is pending.I discussed findings with the patient.  Will treated with Rocephin and azithromycin here in the ED.  I discussed mandatory follow-up.  He reports that he will follow-up with  his PCP at  and declined the option of filling [facility] STD clinic as he reports he has a regular doctor at .  I discussed urologic follow-up as needed in coordination, but discussed the mandatory follow-up of the RPR and HIV testing which he has regularly.We discussed indications for immediate returnVery pleasant patient verbalizes understanding agreement with plan of care, need for follow-up with mandatory follow-up testing, and indications for return.  [HH]      ED Course User Index  [HH] Norbert Pollard MD                  [facility]      Final diagnoses:   Urethritis            Norbert Pollard MD  11/17/18 0147

## 2018-11-19 ENCOUNTER — TELEPHONE (OUTPATIENT)
Dept: EMERGENCY DEPT | Facility: HOSPITAL | Age: 23
End: 2018-11-19

## 2018-11-19 LAB
C TRACH RRNA SPEC DONR QL NAA+PROBE: POSITIVE
N GONORRHOEA DNA SPEC QL NAA+PROBE: POSITIVE
RPR SER QL: NORMAL

## 2020-05-04 ENCOUNTER — APPOINTMENT (OUTPATIENT)
Dept: GENERAL RADIOLOGY | Facility: HOSPITAL | Age: 25
End: 2020-05-04

## 2020-05-04 VITALS
HEIGHT: 67 IN | WEIGHT: 135 LBS | SYSTOLIC BLOOD PRESSURE: 124 MMHG | OXYGEN SATURATION: 99 % | TEMPERATURE: 98.1 F | BODY MASS INDEX: 21.19 KG/M2 | DIASTOLIC BLOOD PRESSURE: 78 MMHG | RESPIRATION RATE: 14 BRPM | HEART RATE: 60 BPM

## 2020-05-04 PROCEDURE — 99282 EMERGENCY DEPT VISIT SF MDM: CPT

## 2020-05-04 PROCEDURE — 73110 X-RAY EXAM OF WRIST: CPT

## 2020-05-05 ENCOUNTER — HOSPITAL ENCOUNTER (EMERGENCY)
Facility: HOSPITAL | Age: 25
Discharge: HOME | End: 2020-05-05
Attending: EMERGENCY MEDICINE | Admitting: EMERGENCY MEDICINE

## 2020-05-05 DIAGNOSIS — S63.502A SPRAIN OF LEFT WRIST, INITIAL ENCOUNTER: Primary | ICD-10-CM

## 2020-05-05 RX ORDER — NAPROXEN 500 MG/1
500 TABLET ORAL 2 TIMES DAILY WITH MEALS
Qty: 20 TABLET | Refills: 0 | Status: SHIPPED | OUTPATIENT
Start: 2020-05-05 | End: 2020-05-05

## 2020-05-05 RX ORDER — NAPROXEN 500 MG/1
500 TABLET ORAL 2 TIMES DAILY WITH MEALS
Qty: 20 TABLET | Refills: 0 | Status: SHIPPED | OUTPATIENT
Start: 2020-05-05

## 2020-05-05 NOTE — ED PROVIDER NOTES
EMERGENCY DEPARTMENT ENCOUNTER      Pt Name: Willy Vu  MRN: [id number]  YOB: 1995  Date of evaluation: 5/4/2020  Provider: Percy Mancilla DO    CHIEF COMPLAINT       Chief Complaint   Patient presents with   • Arm Pain         HISTORY OF PRESENT ILLNESS  (Location/Symptom, Timing/Onset, Context/Setting, Quality, Duration, Modifying Factors, Severity.)   Willy Vu is a 24 y.o. male who presents to the emergency department for evaluation of an injury to left wrist prior to arrival.  He states he accidentally leaned instead over onto his wrist, causing pain along the distal wrist on the left hand.  He is left-hand dominant, denies any numbness or tingling distally.  No prior wrist injuries or surgeries.  Denies any other extremity injury or pain.  He has no other acute systemic complaints at this time.      Nursing notes were reviewed.    REVIEW OF SYSTEMS    (2-9 systems for level 4, 10 or more for level 5)   ROS:  General:  No fevers, no chills, no weakness  Cardiovascular:  No chest pain, no palpitations  Respiratory:  No shortness of breath, no cough, no wheezing  Gastrointestinal:  No pain, no nausea, no vomiting, no diarrhea  Musculoskeletal:  No muscle pain, positive left wrist pain  Skin:  No rash, no easy bruising  Neurologic:  No speech problems, no headache, no extremity numbness, no extremity tingling, no extremity weakness  Psychiatric:  No anxiety  Genitourinary:  No dysuria, no hematuria    Except as noted above the remainder of the review of systems was reviewed and negative.       PAST MEDICAL HISTORY   No past medical history on file.      SURGICAL HISTORY     No past surgical history on file.      CURRENT MEDICATIONS     No current facility-administered medications for this encounter.     Current Outpatient Medications:   •  dicyclomine (BENTYL) 20 MG tablet, Take 1 tablet by mouth Every 6 (Six) Hours As Needed (Abdominal Pain)., Disp: 12 tablet, Rfl: 0  •   "naproxen (Naprosyn) 500 MG tablet, Take 1 tablet by mouth 2 (Two) Times a Day With Meals., Disp: 20 tablet, Rfl: 0  •  omeprazole (priLOSEC) 40 MG capsule, Take 1 capsule by mouth Daily., Disp: 30 capsule, Rfl: 0  •  ondansetron ODT (ZOFRAN-ODT) 4 MG disintegrating tablet, Take 1 tablet by mouth Every 6 (Six) Hours As Needed for Nausea or Vomiting for up to 15 doses., Disp: 15 tablet, Rfl: 0    ALLERGIES     Patient has no known allergies.    FAMILY HISTORY     No family history on file.       SOCIAL HISTORY       Social History     Socioeconomic History   • Marital status: Single     Spouse name: Not on file   • Number of children: Not on file   • Years of education: Not on file   • Highest education level: Not on file   Tobacco Use   • Smoking status: Never Smoker   • Smokeless tobacco: Never Used   Substance and Sexual Activity   • Alcohol use: No   • Drug use: No   • Sexual activity: Defer         PHYSICAL EXAM    (up to 7 for level 4, 8 or more for level 5)     Vitals:    05/04/20 2340   BP: 124/78   BP Location: Left arm   Patient Position: Sitting   Pulse: 60   Resp: 14   Temp: 98.1 °F (36.7 °C)   TempSrc: Oral   SpO2: 99%   Weight: 61.2 kg (135 lb)   Height: 170.2 cm (67\")       Physical Exam  General :Patient is awake, alert, oriented, in no acute distress, nontoxic appearing  HEENT: Pupils are equally round and reactive to light, EOMI, conjunctivae clear, sclerae white, there is no injection no icterus.  Oral mucosa is moist, no exudate. Uvula is midline  Neck: Neck is supple, full range of motion, trachea midline  Cardiac: Heart regular rate, rhythm, no murmurs, rubs, or gallops  Lungs: Lungs are clear to auscultation, there is no wheezing, rhonchi, or rales. There is no use of accessory muscles.  Abdomen: Abdomen is soft, nontender, nondistended. There is no firm or pulsatile masses, no rebound rigidity or guarding.   Musculoskeletal: There is tenderness along the dorsal aspect of the distal left wrist, " no signs of any dislocation, range of motion of wrist limited secondary to pain, there is no tenderness overlying the anatomical snuffbox, distal pulses neurovascular status intact, capillary refill less than 3 seconds.  Joint above checked and is normal.  5 out of 5 strength in all remaining extremities.  No focal muscle deficits are appreciated  Neuro: Motor intact, sensory intact, level of consciousness is normal, GCS 15  Dermatology: Skin is warm and dry  Psych: Mentation is grossly normal, cognition is grossly normal. Affect is appropriate.      DIAGNOSTIC RESULTS     EKG: All EKG's are interpreted by the Emergency Department Physician who either signs or Co-signs this chart in the absence of a cardiologist.    No orders to display       RADIOLOGY:   Non-plain film images such as CT, Ultrasound and MRI are read by the radiologist. Plain radiographic images are visualized and preliminarily interpreted by the emergency physician with the below findings:      [] Radiologist's Report Reviewed:  XR Wrist 3+ View Left   Final Result   Negative left wrist.      Signer Name: Ag Mena MD    Signed: 5/5/2020 12:05 AM    Workstation Name: [id number]     [facility]            ED BEDSIDE ULTRASOUND:   Performed by ED Physician - none    LABS:    I have reviewed and interpreted all of the currently available lab results from this visit (if applicable):  Results for orders placed or performed during the hospital encounter of 11/17/18   Chlamydia trachomatis, Neisseria gonorrhoeae, PCR - Swab, Urethra   Result Value Ref Range    Chlamydia trachomatis, TERESSA Positive (A) Negative    Neisseria gonorrhoeae, TERESSA Positive (A) Negative   RPR   Result Value Ref Range    RPR Non-Reactive Non-Reactive        All other labs were within normal range or not returned as of this dictation.      EMERGENCY DEPARTMENT COURSE and DIFFERENTIAL DIAGNOSIS/MDM:   Vitals:    Vitals:    05/04/20 2340   BP: 124/78   BP  "Location: Left arm   Patient Position: Sitting   Pulse: 60   Resp: 14   Temp: 98.1 °F (36.7 °C)   TempSrc: Oral   SpO2: 99%   Weight: 61.2 kg (135 lb)   Height: 170.2 cm (67\")            Patient with a left wrist strain, no acute findings on physical examination, some tenderness along the dorsal surface of the distal left wrist, x-rays are negative, we discussed ligamentous injury, place a wrist splint, anti-inflammatories, ice for pain control, following up with his PCP for reevaluation. I had a discussion with the patient/family regarding diagnosis, diagnostic results, treatment plan, and medications.  The patient/family indicated understanding of these instructions.  I spent adequate time at the bedside proceeding discharge necessary to personally discuss the aftercare instructions, giving patient education, providing explanations of the results of our evaluations/findings, and my decision making to assure that the patient/family understand the plan of care.  Time was allotted to answer questions at that time and throughout the ED course.  Emphasis was placed on timely follow-up after discharge.  I also discussed the potential for the development of an acute emergent condition requiring further evaluation, admission, or even surgical intervention. I discussed that we found nothing during the visit today indicating the need for further workup, admission, or the presence of an unstable medical condition.  I encouraged the patient to return to the emergency department immediately for ANY concerns, worsening, new complaints, or if symptoms persist and unable to seek follow-up in a timely fashion.  The patient/family expressed understanding and agreement with this plan.  The patient will follow-up with their PCP in 1-2 days for reevaluation.       MEDICATIONS ADMINISTERED IN ED:  Medications - No data to display    PROCEDURES:  Procedures    CRITICAL CARE TIME    Total Critical Care time was 0 minutes, excluding " separately reportable procedures.   There was a high probability of clinically significant/life threatening deterioration in the patient's condition which required my urgent intervention.      FINAL IMPRESSION      1. Sprain of left wrist, initial encounter          DISPOSITION/PLAN     ED Disposition     ED Disposition Condition Comment    Discharge Stable           PATIENT REFERRED TO:  Karla Hernandez, APRN  [address] [address]  [phone]    In 2 days      [facility] Emergency Department  [address] [address] [phone]    If symptoms worsen      DISCHARGE MEDICATIONS:     Medication List      START taking these medications    naproxen 500 MG tablet  Commonly known as:  Naprosyn  Take 1 tablet by mouth 2 (Two) Times a Day With Meals.        CONTINUE taking these medications    dicyclomine 20 MG tablet  Commonly known as:  BENTYL  Take 1 tablet by mouth Every 6 (Six) Hours As Needed (Abdominal Pain).     omeprazole 40 MG capsule  Commonly known as:  priLOSEC  Take 1 capsule by mouth Daily.     ondansetron ODT 4 MG disintegrating tablet  Commonly known as:  ZOFRAN-ODT  Take 1 tablet by mouth Every 6 (Six) Hours As Needed for Nausea or   Vomiting for up to 15 doses.              Comment: Please note this report has been produced using speech recognition software.      Percy Mancilla DO  Attending Emergency Physician               Percy Mancilla DO  05/05/20 0105

## 2020-09-02 ENCOUNTER — HOSPITAL ENCOUNTER (EMERGENCY)
Facility: HOSPITAL | Age: 25
Discharge: HOME | End: 2020-09-02
Attending: EMERGENCY MEDICINE | Admitting: EMERGENCY MEDICINE

## 2020-09-02 ENCOUNTER — APPOINTMENT (OUTPATIENT)
Dept: CT IMAGING | Facility: HOSPITAL | Age: 25
End: 2020-09-02

## 2020-09-02 VITALS
HEART RATE: 70 BPM | SYSTOLIC BLOOD PRESSURE: 134 MMHG | WEIGHT: 125 LBS | OXYGEN SATURATION: 96 % | HEIGHT: 67 IN | TEMPERATURE: 97.3 F | BODY MASS INDEX: 19.62 KG/M2 | DIASTOLIC BLOOD PRESSURE: 72 MMHG | RESPIRATION RATE: 24 BRPM

## 2020-09-02 DIAGNOSIS — F12.90 MARIJUANA USE: ICD-10-CM

## 2020-09-02 DIAGNOSIS — R10.30 LOWER ABDOMINAL PAIN: Primary | ICD-10-CM

## 2020-09-02 DIAGNOSIS — R11.2 NON-INTRACTABLE VOMITING WITH NAUSEA, UNSPECIFIED VOMITING TYPE: ICD-10-CM

## 2020-09-02 LAB
ALBUMIN SERPL-MCNC: 5 G/DL (ref 3.5–5.2)
ALBUMIN/GLOB SERPL: 1.6 G/DL
ALP SERPL-CCNC: 54 U/L (ref 39–117)
ALT SERPL W P-5'-P-CCNC: 12 U/L (ref 1–41)
ANION GAP SERPL CALCULATED.3IONS-SCNC: 19 MMOL/L (ref 5–15)
AST SERPL-CCNC: 21 U/L (ref 1–40)
BACTERIA UR QL AUTO: NORMAL /HPF
BASOPHILS # BLD AUTO: 0.05 10*3/MM3 (ref 0–0.2)
BASOPHILS NFR BLD AUTO: 0.6 % (ref 0–1.5)
BILIRUB SERPL-MCNC: 0.6 MG/DL (ref 0–1.2)
BILIRUB UR QL STRIP: NEGATIVE
BUN SERPL-MCNC: 12 MG/DL (ref 6–20)
BUN/CREAT SERPL: 15.4 (ref 7–25)
CALCIUM SPEC-SCNC: 10 MG/DL (ref 8.6–10.5)
CHLORIDE SERPL-SCNC: 105 MMOL/L (ref 98–107)
CLARITY UR: CLEAR
CO2 SERPL-SCNC: 20 MMOL/L (ref 22–29)
COLOR UR: YELLOW
CREAT SERPL-MCNC: 0.78 MG/DL (ref 0.76–1.27)
DEPRECATED RDW RBC AUTO: 44 FL (ref 37–54)
EOSINOPHIL # BLD AUTO: 0.03 10*3/MM3 (ref 0–0.4)
EOSINOPHIL NFR BLD AUTO: 0.4 % (ref 0.3–6.2)
ERYTHROCYTE [DISTWIDTH] IN BLOOD BY AUTOMATED COUNT: 12.9 % (ref 12.3–15.4)
GFR SERPL CREATININE-BSD FRML MDRD: >150 ML/MIN/1.73
GLOBULIN UR ELPH-MCNC: 3.1 GM/DL
GLUCOSE SERPL-MCNC: 100 MG/DL (ref 65–99)
GLUCOSE UR STRIP-MCNC: NEGATIVE MG/DL
HCT VFR BLD AUTO: 43.3 % (ref 37.5–51)
HGB BLD-MCNC: 14.4 G/DL (ref 13–17.7)
HGB UR QL STRIP.AUTO: NEGATIVE
HOLD SPECIMEN: NORMAL
HOLD SPECIMEN: NORMAL
HYALINE CASTS UR QL AUTO: NORMAL /LPF
IMM GRANULOCYTES # BLD AUTO: 0.06 10*3/MM3 (ref 0–0.05)
IMM GRANULOCYTES NFR BLD AUTO: 0.7 % (ref 0–0.5)
KETONES UR QL STRIP: ABNORMAL
LEUKOCYTE ESTERASE UR QL STRIP.AUTO: NEGATIVE
LIPASE SERPL-CCNC: 12 U/L (ref 13–60)
LYMPHOCYTES # BLD AUTO: 1.57 10*3/MM3 (ref 0.7–3.1)
LYMPHOCYTES NFR BLD AUTO: 19 % (ref 19.6–45.3)
MCH RBC QN AUTO: 30.4 PG (ref 26.6–33)
MCHC RBC AUTO-ENTMCNC: 33.3 G/DL (ref 31.5–35.7)
MCV RBC AUTO: 91.5 FL (ref 79–97)
MONOCYTES # BLD AUTO: 0.5 10*3/MM3 (ref 0.1–0.9)
MONOCYTES NFR BLD AUTO: 6.1 % (ref 5–12)
NEUTROPHILS NFR BLD AUTO: 6.05 10*3/MM3 (ref 1.7–7)
NEUTROPHILS NFR BLD AUTO: 73.2 % (ref 42.7–76)
NITRITE UR QL STRIP: NEGATIVE
NRBC BLD AUTO-RTO: 0 /100 WBC (ref 0–0.2)
PH UR STRIP.AUTO: 6.5 [PH] (ref 5–8)
PLATELET # BLD AUTO: 283 10*3/MM3 (ref 140–450)
PMV BLD AUTO: 11.3 FL (ref 6–12)
POTASSIUM SERPL-SCNC: 3.3 MMOL/L (ref 3.5–5.2)
PROT SERPL-MCNC: 8.1 G/DL (ref 6–8.5)
PROT UR QL STRIP: ABNORMAL
RBC # BLD AUTO: 4.73 10*6/MM3 (ref 4.14–5.8)
RBC # UR: NORMAL /HPF
REF LAB TEST METHOD: NORMAL
SODIUM SERPL-SCNC: 144 MMOL/L (ref 136–145)
SP GR UR STRIP: 1.02 (ref 1–1.03)
SQUAMOUS #/AREA URNS HPF: NORMAL /HPF
UROBILINOGEN UR QL STRIP: ABNORMAL
WBC # BLD AUTO: 8.26 10*3/MM3 (ref 3.4–10.8)
WBC UR QL AUTO: NORMAL /HPF
WHOLE BLOOD HOLD SPECIMEN: NORMAL
WHOLE BLOOD HOLD SPECIMEN: NORMAL

## 2020-09-02 PROCEDURE — 25010000002 ONDANSETRON PER 1 MG: Performed by: EMERGENCY MEDICINE

## 2020-09-02 PROCEDURE — 96374 THER/PROPH/DIAG INJ IV PUSH: CPT

## 2020-09-02 PROCEDURE — 99284 EMERGENCY DEPT VISIT MOD MDM: CPT

## 2020-09-02 PROCEDURE — 85025 COMPLETE CBC W/AUTO DIFF WBC: CPT | Performed by: EMERGENCY MEDICINE

## 2020-09-02 PROCEDURE — 74176 CT ABD & PELVIS W/O CONTRAST: CPT

## 2020-09-02 PROCEDURE — 81001 URINALYSIS AUTO W/SCOPE: CPT | Performed by: EMERGENCY MEDICINE

## 2020-09-02 PROCEDURE — 83690 ASSAY OF LIPASE: CPT | Performed by: EMERGENCY MEDICINE

## 2020-09-02 PROCEDURE — 80053 COMPREHEN METABOLIC PANEL: CPT | Performed by: EMERGENCY MEDICINE

## 2020-09-02 RX ORDER — ONDANSETRON 4 MG/1
4 TABLET, FILM COATED ORAL EVERY 6 HOURS PRN
Qty: 15 TABLET | Refills: 0 | Status: SHIPPED | OUTPATIENT
Start: 2020-09-02

## 2020-09-02 RX ORDER — SODIUM CHLORIDE 0.9 % (FLUSH) 0.9 %
10 SYRINGE (ML) INJECTION AS NEEDED
Status: DISCONTINUED | OUTPATIENT
Start: 2020-09-02 | End: 2020-09-02 | Stop reason: HOSPADM

## 2020-09-02 RX ORDER — ONDANSETRON 2 MG/ML
4 INJECTION INTRAMUSCULAR; INTRAVENOUS ONCE
Status: COMPLETED | OUTPATIENT
Start: 2020-09-02 | End: 2020-09-02

## 2020-09-02 RX ADMIN — SODIUM CHLORIDE 1000 ML: 9 INJECTION, SOLUTION INTRAVENOUS at 06:26

## 2020-09-02 RX ADMIN — ONDANSETRON 4 MG: 2 INJECTION INTRAMUSCULAR; INTRAVENOUS at 06:28

## 2020-09-02 NOTE — ED PROVIDER NOTES
"Subjective   This patient is a 24-year-old -American male who comes in today with abdominal pain, nausea and vomiting that started at approximately 2 AM this morning.  He tells me he felt well last night when he went to bed.  He has had normal bowel movements.  He actually had a bowel movement this morning before coming into the emergency department.  No diarrhea.  No blood in his stool or vomit.  No hemoptysis.  Denies any chest pain or shortness of breath.  Denies any fevers chills or cough.  He tells me he does not feel well but does have a \"long history of stomach problems.\"  When asked to illuminate these problems, he tells me that he often times have his stomach problems and has irregular bowel movements accordingly.  He tells me he smokes marijuana quite actively.  He denies any specific history or diagnosis of cyclic vomiting syndrome.  He tells me the pain is in his mid abdomen.  He denies any radiation to the back.  Denies any history of pancreatitis or appendicitis.  He is in good spirits, oriented x4, making jokes, and pleasant but tells me he does not feel well.  Patient denies any new medications.  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    Past medical history  GERD    Family history  Positive for hypertension.  No history of CVA/CAD.          Review of Systems   Constitutional: Negative.  Negative for chills, fatigue, fever and unexpected weight change.   HENT: Negative for dental problem, ear pain, hearing loss and sinus pressure.    Eyes: Negative for pain and visual disturbance.   Respiratory: Negative for chest tightness and shortness of breath.    Cardiovascular: Negative for chest pain, palpitations and leg swelling.   Gastrointestinal: Positive for abdominal pain, nausea and vomiting. Negative for blood in stool and diarrhea.   Genitourinary: Negative for difficulty urinating, dysuria, frequency, hematuria and urgency.   Musculoskeletal: Negative for myalgias, " neck pain and neck stiffness.   Neurological: Negative for seizures, syncope, speech difficulty, light-headedness and headaches.   Psychiatric/Behavioral: Negative for confusion.   All other systems reviewed and are negative.      History reviewed. No pertinent past medical history.    No Known Allergies    History reviewed. No pertinent surgical history.    History reviewed. No pertinent family history.    Social History     Socioeconomic History   • Marital status: Single     Spouse name: Not on file   • Number of children: Not on file   • Years of education: Not on file   • Highest education level: Not on file   Tobacco Use   • Smoking status: Never Smoker   • Smokeless tobacco: Never Used   Substance and Sexual Activity   • Alcohol use: Yes   • Drug use: Yes     Types: Marijuana   • Sexual activity: Defer           Objective   Physical Exam   Constitutional: He is oriented to person, place, and time. He appears well-developed.  Non-toxic appearance. No distress.   HENT:   Head: Normocephalic and atraumatic.   Right Ear: Tympanic membrane, external ear and ear canal normal.   Left Ear: Tympanic membrane, external ear and ear canal normal.   Nose: Nose normal. No nasal septal hematoma.   Mouth/Throat: Oropharynx is clear and moist. Mucous membranes are not dry. No oral lesions. No trismus in the jaw. No dental abscesses or uvula swelling. No posterior oropharyngeal erythema or tonsillar abscesses. No tonsillar exudate.   Eyes: Pupils are equal, round, and reactive to light. EOM are normal. Right conjunctiva is not injected. Left conjunctiva is not injected.   Neck: Normal range of motion. Neck supple. No JVD present. No tracheal tenderness present. No neck rigidity. Normal range of motion present.   Cardiovascular: Normal rate, regular rhythm and normal heart sounds. Exam reveals no gallop and no friction rub.   Pulmonary/Chest: Effort normal and breath sounds normal. He has no wheezes. He has no rales. He  exhibits no tenderness.   Abdominal: Soft. Bowel sounds are normal. He exhibits no distension, no pulsatile midline mass and no mass. There is tenderness. There is no rigidity, no rebound, no guarding and no tenderness at McBurney's point.   No signs of acute abdomen.  No pain at McBurney's point.  No pulsatile abdominal mass.  Mild pain in the midepigastrium and in the region around the umbilicus.   Musculoskeletal: Normal range of motion. He exhibits no edema, tenderness or deformity.   Lymphadenopathy:     He has no cervical adenopathy.   Neurological: He is alert and oriented to person, place, and time. He has normal strength. He displays no tremor. No cranial nerve deficit or sensory deficit. He exhibits normal muscle tone.   5/5 strength bilaterally with flexion and extension of fingers, wrist, elbows, knees and hips as well as plantar and dorsiflexion of the foot.   Skin: Skin is warm and dry. Capillary refill takes less than 2 seconds. No rash noted. No erythema.   No diaphoresis, lesions, nevi, petechia, purpura   Psychiatric: He has a normal mood and affect. His speech is normal and behavior is normal. Judgment and thought content normal. He is attentive.   Nursing note and vitals reviewed.      Procedures           ED Course  ED Course as of Sep 03 0629   Wed Sep 02, 2020   0631 I had a good conversation with the patient.  Although I was initially told that he was vomiting without remitting, this was not the case.  I found him very pleasant, making jokes, and in no acute distress.  He certainly does have a small amount of abdominal pain as noted in the physical exam.  I am going to move forward with IV fluid rehydration, Zofran, and CT imaging of the abdomen.  Differential diagnosis and medical decision making discussed in detail with the patient.  We talked about kidney stones, appendicitis, bowel obstruction, marijuana related cyclic vomiting syndrome, colitis, and multiple other etiologies.  The  "patient's vital signs are essentially unremarkable here.  Heart rate currently 56.  Oxygen saturations 100% on room air.  Patient is afebrile.  I do anticipate ultimate discharge home.  Final disposition and plan following completion of work-up.  Patient is agreeable to the plan and without question or complaint and appreciative for care at this time    [AMY]   0725 Patient has had his CT of the abdomen and pelvis.  Comparison was made to 5/8/18.  No significant change from prior CT image and no evidence of acute abnormality today on CT images.  I reviewed images myself independently and these have been read officially by the radiologist on call.    [AMY]   0725 Additionally, urinalysis essentially unremarkable.  CBC shows a white count of 8.26 with hemoglobin and hematocrit unremarkable.  Platelets 283.  Lipase 12.  CMP does show mild Hypokalemia at 3.3.    [AMY]   0726 Patient is receiving the rest of his IV fluid rehydration.  I plan to reexamine the patient shortly, but I am encouraged that he has had no additional vomiting since administration of medication here.  I do anticipate discharge home shortly.    [AMY]   0745 I reexamined the patient at approximately 7:30 AM Eastern time.  I found him resting comfortably and feeling much better.  He was very happy with the care here and verbalized the several times.  His significant other showed up and we talked about the medical decision making in my thought process.  I am highly suspicious that marijuana and is high utilization of marijuana throughout the day is at least partially to blame.  Patient and his significant other indicated that until recently, the patient was smoking marijuana \"throughout the day.\"  He is now down to 2-3 uses per day.  I have encouraged him to decrease this and watch if symptoms resolve.  We also have talked about the need to follow-up with GI.  The patient already has a GI physician at [facility] and plans to make an appointment " in the next week or so.  Impression will include unspecified abdominal pain and non-intractable vomiting as well as marijuana use.  Plan will include Zofran, maintain good fluid intake, follow-up with your primary care physician at the [facility] as well as with GI as already scheduled.  Patient requested a COVID test I told him I would be happy to do this before discharge.  Patient is to return immediately for new or changing concerns.  Patient is agreeable to the plan and without question or complaint and will be discharged home accordingly in good spirits and very appreciative for care.    [AMY]      ED Course User Index  [AMY] Navjot Jacobo MD     No results found for this or any previous visit (from the past 24 hour(s)).  Note: In addition to lab results from this visit, the labs listed above may include labs taken at another facility or during a different encounter within the last 24 hours. Please correlate lab times with ED admission and discharge times for further clarification of the services performed during this visit.    CT Abdomen Pelvis Without Contrast   Final Result      CT of the abdomen and pelvis demonstrating no acute findings. No significant change from May 5, 2018.               Signer Name: Isaac Marin MD    Signed: 9/2/2020 7:11 AM    Workstation Name: [id number]     [facility]        Vitals:    09/02/20 0702 09/02/20 0730 09/02/20 0745 09/02/20 0800   BP:  99/46  134/72   BP Location:       Patient Position:       Pulse:  56 70    Resp:       Temp:       TempSrc:       SpO2: 97% 95% 96%    Weight:       Height:         Medications   sodium chloride 0.9 % bolus 1,000 mL (0 mL Intravenous Stopped 9/2/20 0823)   ondansetron (ZOFRAN) injection 4 mg (4 mg Intravenous Given 9/2/20 0628)     ECG/EMG Results (last 24 hours)     ** No results found for the last 24 hours. **        No orders to display                                               MDM    Final  diagnoses:   Lower abdominal pain   Non-intractable vomiting with nausea, unspecified vomiting type   Marijuana use            Navjot Jacobo MD  09/03/20 7996

## 2020-09-02 NOTE — DISCHARGE INSTRUCTIONS
Take medications as prescribed.  Decrease marijuana use as we discussed at the bedside.  Maintain good fluid intake.    Follow-up with your primary provider this week.    Return to the emergency department immediately for new or changing concerns.

## 2020-09-29 PROCEDURE — U0003 INFECTIOUS AGENT DETECTION BY NUCLEIC ACID (DNA OR RNA); SEVERE ACUTE RESPIRATORY SYNDROME CORONAVIRUS 2 (SARS-COV-2) (CORONAVIRUS DISEASE [COVID-19]), AMPLIFIED PROBE TECHNIQUE, MAKING USE OF HIGH THROUGHPUT TECHNOLOGIES AS DESCRIBED BY CMS-2020-01-R: HCPCS | Performed by: FAMILY MEDICINE

## 2020-10-01 ENCOUNTER — TELEPHONE (OUTPATIENT)
Dept: URGENT CARE | Facility: CLINIC | Age: 25
End: 2020-10-01

## 2020-10-01 NOTE — TELEPHONE ENCOUNTER
----- Message from Isaac Schmitz MD sent at 10/1/2020 12:26 PM EDT -----  Please inform patient of negative lab work    ----- Message -----  From: Lab, Background User  Sent: 10/1/2020  12:11 PM EDT  To:  [facility] Covid Results

## 2020-11-26 ENCOUNTER — HOSPITAL ENCOUNTER (EMERGENCY)
Facility: HOSPITAL | Age: 25
Discharge: HOME | End: 2020-11-26
Attending: EMERGENCY MEDICINE | Admitting: EMERGENCY MEDICINE

## 2020-11-26 VITALS
OXYGEN SATURATION: 98 % | SYSTOLIC BLOOD PRESSURE: 110 MMHG | TEMPERATURE: 98.2 F | BODY MASS INDEX: 19.3 KG/M2 | HEART RATE: 72 BPM | RESPIRATION RATE: 18 BRPM | DIASTOLIC BLOOD PRESSURE: 62 MMHG | WEIGHT: 123 LBS | HEIGHT: 67 IN

## 2020-11-26 DIAGNOSIS — U07.1 COVID-19: ICD-10-CM

## 2020-11-26 DIAGNOSIS — J02.9 VIRAL PHARYNGITIS: Primary | ICD-10-CM

## 2020-11-26 LAB
B PARAPERT DNA SPEC QL NAA+PROBE: NOT DETECTED
B PERT DNA SPEC QL NAA+PROBE: NOT DETECTED
C PNEUM DNA NPH QL NAA+NON-PROBE: NOT DETECTED
FLUAV H1 2009 PAND RNA NPH QL NAA+PROBE: NOT DETECTED
FLUAV H1 HA GENE NPH QL NAA+PROBE: NOT DETECTED
FLUAV H3 RNA NPH QL NAA+PROBE: NOT DETECTED
FLUAV SUBTYP SPEC NAA+PROBE: NOT DETECTED
FLUBV RNA ISLT QL NAA+PROBE: NOT DETECTED
HADV DNA SPEC NAA+PROBE: NOT DETECTED
HCOV 229E RNA SPEC QL NAA+PROBE: NOT DETECTED
HCOV HKU1 RNA SPEC QL NAA+PROBE: NOT DETECTED
HCOV NL63 RNA SPEC QL NAA+PROBE: NOT DETECTED
HCOV OC43 RNA SPEC QL NAA+PROBE: NOT DETECTED
HIV1+2 AB SER QL: NORMAL
HMPV RNA NPH QL NAA+NON-PROBE: NOT DETECTED
HPIV1 RNA SPEC QL NAA+PROBE: NOT DETECTED
HPIV2 RNA SPEC QL NAA+PROBE: NOT DETECTED
HPIV3 RNA NPH QL NAA+PROBE: NOT DETECTED
HPIV4 P GENE NPH QL NAA+PROBE: NOT DETECTED
M PNEUMO IGG SER IA-ACNC: NOT DETECTED
RHINOVIRUS RNA SPEC NAA+PROBE: NOT DETECTED
RSV RNA NPH QL NAA+NON-PROBE: NOT DETECTED
S PYO AG THROAT QL: NEGATIVE
SARS-COV-2 RNA NPH QL NAA+NON-PROBE: DETECTED

## 2020-11-26 PROCEDURE — G0432 EIA HIV-1/HIV-2 SCREEN: HCPCS | Performed by: EMERGENCY MEDICINE

## 2020-11-26 PROCEDURE — 25010000002 DEXAMETHASONE PER 1 MG: Performed by: EMERGENCY MEDICINE

## 2020-11-26 PROCEDURE — 87081 CULTURE SCREEN ONLY: CPT | Performed by: EMERGENCY MEDICINE

## 2020-11-26 PROCEDURE — 99283 EMERGENCY DEPT VISIT LOW MDM: CPT

## 2020-11-26 PROCEDURE — 0202U NFCT DS 22 TRGT SARS-COV-2: CPT | Performed by: EMERGENCY MEDICINE

## 2020-11-26 PROCEDURE — 87880 STREP A ASSAY W/OPTIC: CPT | Performed by: EMERGENCY MEDICINE

## 2020-11-26 RX ADMIN — DEXAMETHASONE SODIUM PHOSPHATE 10 MG: 10 INJECTION INTRAMUSCULAR; INTRAVENOUS at 03:19

## 2020-11-28 LAB — BACTERIA SPEC AEROBE CULT: NORMAL

## 2020-11-30 ENCOUNTER — EPISODE CHANGES (OUTPATIENT)
Dept: CASE MANAGEMENT | Facility: OTHER | Age: 25
End: 2020-11-30

## 2020-12-08 ENCOUNTER — PATIENT OUTREACH (OUTPATIENT)
Dept: CASE MANAGEMENT | Facility: OTHER | Age: 25
End: 2020-12-08

## 2020-12-08 NOTE — OUTREACH NOTE
ED Potential Covid Discharge Follow-up    Attempted to contact pt regarding [facility] ED visit 11/26/2020 with Covid 19 testing done, which has resulted as Detected.  4 attempts were made and all 4 attempts were unsuccessful.      Mindi Pelayo RN  Ambulatory     12/8/2020, 15:37 EST

## 2020-12-08 NOTE — OUTREACH NOTE
Care Coordination Note    Per notification protocol-  Ghislaine at PCP, Karla CHARLES's office notified of [facility] ED visit 112/6/2020 with positive Covid 19 test and that 4 attempts were made but was unsuccessful in contacting pt.  Ghislaine states they did get notification and has spoken with pt since his visit.  She voiced her appreciation for the call.     Mindi Pelayo RN  Ambulatory     12/8/2020, 15:38 EST

## 2021-01-19 ENCOUNTER — NURSE TRIAGE (OUTPATIENT)
Dept: CALL CENTER | Facility: HOSPITAL | Age: 26
End: 2021-01-19

## 2021-01-19 ENCOUNTER — APPOINTMENT (OUTPATIENT)
Dept: CT IMAGING | Facility: HOSPITAL | Age: 26
End: 2021-01-19

## 2021-01-19 ENCOUNTER — HOSPITAL ENCOUNTER (EMERGENCY)
Facility: HOSPITAL | Age: 26
Discharge: HOME | End: 2021-01-19
Attending: EMERGENCY MEDICINE | Admitting: EMERGENCY MEDICINE

## 2021-01-19 VITALS
BODY MASS INDEX: 19.3 KG/M2 | OXYGEN SATURATION: 97 % | HEART RATE: 76 BPM | SYSTOLIC BLOOD PRESSURE: 130 MMHG | DIASTOLIC BLOOD PRESSURE: 60 MMHG | TEMPERATURE: 98.6 F | WEIGHT: 123 LBS | HEIGHT: 67 IN | RESPIRATION RATE: 18 BRPM

## 2021-01-19 DIAGNOSIS — R11.2 NAUSEA VOMITING AND DIARRHEA: ICD-10-CM

## 2021-01-19 DIAGNOSIS — R10.30 LOWER ABDOMINAL PAIN: Primary | ICD-10-CM

## 2021-01-19 DIAGNOSIS — R19.7 NAUSEA VOMITING AND DIARRHEA: ICD-10-CM

## 2021-01-19 DIAGNOSIS — E87.6 HYPOKALEMIA: ICD-10-CM

## 2021-01-19 LAB
ALBUMIN SERPL-MCNC: 5 G/DL (ref 3.5–5.2)
ALBUMIN/GLOB SERPL: 1.7 G/DL
ALP SERPL-CCNC: 58 U/L (ref 39–117)
ALT SERPL W P-5'-P-CCNC: 14 U/L (ref 1–41)
ANION GAP SERPL CALCULATED.3IONS-SCNC: 17 MMOL/L (ref 5–15)
AST SERPL-CCNC: 19 U/L (ref 1–40)
BASOPHILS # BLD AUTO: 0.02 10*3/MM3 (ref 0–0.2)
BASOPHILS NFR BLD AUTO: 0.2 % (ref 0–1.5)
BILIRUB SERPL-MCNC: 0.9 MG/DL (ref 0–1.2)
BILIRUB UR QL STRIP: NEGATIVE
BUN SERPL-MCNC: 13 MG/DL (ref 6–20)
BUN/CREAT SERPL: 14 (ref 7–25)
CALCIUM SPEC-SCNC: 10 MG/DL (ref 8.6–10.5)
CHLORIDE SERPL-SCNC: 101 MMOL/L (ref 98–107)
CLARITY UR: CLEAR
CO2 SERPL-SCNC: 22 MMOL/L (ref 22–29)
COLOR UR: YELLOW
CREAT SERPL-MCNC: 0.93 MG/DL (ref 0.76–1.27)
DEPRECATED RDW RBC AUTO: 43 FL (ref 37–54)
EOSINOPHIL # BLD AUTO: 0.01 10*3/MM3 (ref 0–0.4)
EOSINOPHIL NFR BLD AUTO: 0.1 % (ref 0.3–6.2)
ERYTHROCYTE [DISTWIDTH] IN BLOOD BY AUTOMATED COUNT: 12.4 % (ref 12.3–15.4)
GFR SERPL CREATININE-BSD FRML MDRD: 122 ML/MIN/1.73
GLOBULIN UR ELPH-MCNC: 2.9 GM/DL
GLUCOSE SERPL-MCNC: 127 MG/DL (ref 65–99)
GLUCOSE UR STRIP-MCNC: NEGATIVE MG/DL
HCT VFR BLD AUTO: 42.7 % (ref 37.5–51)
HGB BLD-MCNC: 14 G/DL (ref 13–17.7)
HGB UR QL STRIP.AUTO: NEGATIVE
HOLD SPECIMEN: NORMAL
HOLD SPECIMEN: NORMAL
IMM GRANULOCYTES # BLD AUTO: 0.05 10*3/MM3 (ref 0–0.05)
IMM GRANULOCYTES NFR BLD AUTO: 0.5 % (ref 0–0.5)
KETONES UR QL STRIP: ABNORMAL
LEUKOCYTE ESTERASE UR QL STRIP.AUTO: NEGATIVE
LIPASE SERPL-CCNC: 12 U/L (ref 13–60)
LYMPHOCYTES # BLD AUTO: 0.72 10*3/MM3 (ref 0.7–3.1)
LYMPHOCYTES NFR BLD AUTO: 6.8 % (ref 19.6–45.3)
MCH RBC QN AUTO: 30.8 PG (ref 26.6–33)
MCHC RBC AUTO-ENTMCNC: 32.8 G/DL (ref 31.5–35.7)
MCV RBC AUTO: 93.8 FL (ref 79–97)
MONOCYTES # BLD AUTO: 0.79 10*3/MM3 (ref 0.1–0.9)
MONOCYTES NFR BLD AUTO: 7.4 % (ref 5–12)
NEUTROPHILS NFR BLD AUTO: 85 % (ref 42.7–76)
NEUTROPHILS NFR BLD AUTO: 9.04 10*3/MM3 (ref 1.7–7)
NITRITE UR QL STRIP: NEGATIVE
NRBC BLD AUTO-RTO: 0 /100 WBC (ref 0–0.2)
PH UR STRIP.AUTO: 6.5 [PH] (ref 5–8)
PLATELET # BLD AUTO: 215 10*3/MM3 (ref 140–450)
PMV BLD AUTO: 11 FL (ref 6–12)
POTASSIUM SERPL-SCNC: 3 MMOL/L (ref 3.5–5.2)
PROT SERPL-MCNC: 7.9 G/DL (ref 6–8.5)
PROT UR QL STRIP: ABNORMAL
RBC # BLD AUTO: 4.55 10*6/MM3 (ref 4.14–5.8)
SODIUM SERPL-SCNC: 140 MMOL/L (ref 136–145)
SP GR UR STRIP: 1.03 (ref 1–1.03)
UROBILINOGEN UR QL STRIP: ABNORMAL
WBC # BLD AUTO: 10.63 10*3/MM3 (ref 3.4–10.8)
WHOLE BLOOD HOLD SPECIMEN: NORMAL
WHOLE BLOOD HOLD SPECIMEN: NORMAL

## 2021-01-19 PROCEDURE — 25010000002 ONDANSETRON PER 1 MG

## 2021-01-19 PROCEDURE — 99284 EMERGENCY DEPT VISIT MOD MDM: CPT

## 2021-01-19 PROCEDURE — 96375 TX/PRO/DX INJ NEW DRUG ADDON: CPT

## 2021-01-19 PROCEDURE — 80053 COMPREHEN METABOLIC PANEL: CPT | Performed by: EMERGENCY MEDICINE

## 2021-01-19 PROCEDURE — 81003 URINALYSIS AUTO W/O SCOPE: CPT

## 2021-01-19 PROCEDURE — 25010000002 IOPAMIDOL 61 % SOLUTION: Performed by: EMERGENCY MEDICINE

## 2021-01-19 PROCEDURE — 74177 CT ABD & PELVIS W/CONTRAST: CPT

## 2021-01-19 PROCEDURE — 85025 COMPLETE CBC W/AUTO DIFF WBC: CPT

## 2021-01-19 PROCEDURE — 25010000002 MORPHINE PER 10 MG: Performed by: EMERGENCY MEDICINE

## 2021-01-19 PROCEDURE — 83690 ASSAY OF LIPASE: CPT | Performed by: EMERGENCY MEDICINE

## 2021-01-19 PROCEDURE — 96365 THER/PROPH/DIAG IV INF INIT: CPT

## 2021-01-19 PROCEDURE — 25010000003 POTASSIUM CHLORIDE 10 MEQ/100ML SOLUTION: Performed by: EMERGENCY MEDICINE

## 2021-01-19 RX ORDER — MORPHINE SULFATE 4 MG/ML
4 INJECTION, SOLUTION INTRAMUSCULAR; INTRAVENOUS ONCE
Status: DISCONTINUED | OUTPATIENT
Start: 2021-01-19 | End: 2021-01-20 | Stop reason: HOSPADM

## 2021-01-19 RX ORDER — ONDANSETRON 2 MG/ML
INJECTION INTRAMUSCULAR; INTRAVENOUS
Status: COMPLETED
Start: 2021-01-19 | End: 2021-01-19

## 2021-01-19 RX ORDER — DICYCLOMINE HYDROCHLORIDE 10 MG/ML
20 INJECTION INTRAMUSCULAR ONCE
Status: DISCONTINUED | OUTPATIENT
Start: 2021-01-19 | End: 2021-01-19

## 2021-01-19 RX ORDER — ONDANSETRON 2 MG/ML
4 INJECTION INTRAMUSCULAR; INTRAVENOUS ONCE
Status: COMPLETED | OUTPATIENT
Start: 2021-01-19 | End: 2021-01-19

## 2021-01-19 RX ORDER — MORPHINE SULFATE 4 MG/ML
4 INJECTION, SOLUTION INTRAMUSCULAR; INTRAVENOUS ONCE
Status: COMPLETED | OUTPATIENT
Start: 2021-01-19 | End: 2021-01-19

## 2021-01-19 RX ORDER — POTASSIUM CHLORIDE 7.45 MG/ML
10 INJECTION INTRAVENOUS ONCE
Status: COMPLETED | OUTPATIENT
Start: 2021-01-19 | End: 2021-01-19

## 2021-01-19 RX ORDER — SODIUM CHLORIDE 9 MG/ML
10 INJECTION INTRAVENOUS AS NEEDED
Status: DISCONTINUED | OUTPATIENT
Start: 2021-01-19 | End: 2021-01-20 | Stop reason: HOSPADM

## 2021-01-19 RX ORDER — PROMETHAZINE HYDROCHLORIDE 25 MG/1
25 TABLET ORAL EVERY 6 HOURS PRN
Qty: 16 TABLET | Refills: 0 | Status: SHIPPED | OUTPATIENT
Start: 2021-01-19

## 2021-01-19 RX ORDER — DICYCLOMINE HCL 20 MG
20 TABLET ORAL EVERY 6 HOURS
Qty: 24 TABLET | Refills: 0 | Status: SHIPPED | OUTPATIENT
Start: 2021-01-19

## 2021-01-19 RX ADMIN — ONDANSETRON 4 MG: 2 INJECTION INTRAMUSCULAR; INTRAVENOUS at 20:16

## 2021-01-19 RX ADMIN — POTASSIUM CHLORIDE 10 MEQ: 7.46 INJECTION, SOLUTION INTRAVENOUS at 21:01

## 2021-01-19 RX ADMIN — MORPHINE SULFATE 4 MG: 4 INJECTION INTRAVENOUS at 21:01

## 2021-01-19 RX ADMIN — SODIUM CHLORIDE 1000 ML: 9 INJECTION, SOLUTION INTRAVENOUS at 20:15

## 2021-01-19 RX ADMIN — IOPAMIDOL 90 ML: 612 INJECTION, SOLUTION INTRAVENOUS at 21:34

## 2021-01-20 ENCOUNTER — HOSPITAL ENCOUNTER (EMERGENCY)
Facility: HOSPITAL | Age: 26
Discharge: HOME | End: 2021-01-20
Attending: EMERGENCY MEDICINE | Admitting: EMERGENCY MEDICINE

## 2021-01-20 ENCOUNTER — TELEPHONE (OUTPATIENT)
Dept: EMERGENCY DEPT | Facility: HOSPITAL | Age: 26
End: 2021-01-20

## 2021-01-20 VITALS
HEIGHT: 67 IN | OXYGEN SATURATION: 97 % | WEIGHT: 123 LBS | HEART RATE: 65 BPM | RESPIRATION RATE: 16 BRPM | TEMPERATURE: 98.9 F | BODY MASS INDEX: 19.3 KG/M2 | SYSTOLIC BLOOD PRESSURE: 112 MMHG | DIASTOLIC BLOOD PRESSURE: 57 MMHG

## 2021-01-20 DIAGNOSIS — R11.2 NAUSEA VOMITING AND DIARRHEA: Primary | ICD-10-CM

## 2021-01-20 DIAGNOSIS — R10.30 LOWER ABDOMINAL PAIN: ICD-10-CM

## 2021-01-20 DIAGNOSIS — R19.7 NAUSEA VOMITING AND DIARRHEA: Primary | ICD-10-CM

## 2021-01-20 LAB
ADV 40+41 DNA STL QL NAA+NON-PROBE: NOT DETECTED
ALBUMIN SERPL-MCNC: 4.4 G/DL (ref 3.5–5.2)
ALBUMIN/GLOB SERPL: 1.6 G/DL
ALP SERPL-CCNC: 47 U/L (ref 39–117)
ALT SERPL W P-5'-P-CCNC: 12 U/L (ref 1–41)
ANION GAP SERPL CALCULATED.3IONS-SCNC: 12 MMOL/L (ref 5–15)
AST SERPL-CCNC: 18 U/L (ref 1–40)
ASTRO TYP 1-8 RNA STL QL NAA+NON-PROBE: NOT DETECTED
BASOPHILS # BLD AUTO: 0.01 10*3/MM3 (ref 0–0.2)
BASOPHILS NFR BLD AUTO: 0.1 % (ref 0–1.5)
BILIRUB SERPL-MCNC: 0.7 MG/DL (ref 0–1.2)
BUN SERPL-MCNC: 11 MG/DL (ref 6–20)
BUN/CREAT SERPL: 14.9 (ref 7–25)
C CAYETANENSIS DNA STL QL NAA+NON-PROBE: NOT DETECTED
CALCIUM SPEC-SCNC: 9.4 MG/DL (ref 8.6–10.5)
CAMPY SP DNA.DIARRHEA STL QL NAA+PROBE: NOT DETECTED
CHLORIDE SERPL-SCNC: 105 MMOL/L (ref 98–107)
CO2 SERPL-SCNC: 23 MMOL/L (ref 22–29)
CREAT SERPL-MCNC: 0.74 MG/DL (ref 0.76–1.27)
CRYPTOSP STL CULT: NOT DETECTED
DEPRECATED RDW RBC AUTO: 42.8 FL (ref 37–54)
E COLI DNA SPEC QL NAA+PROBE: NOT DETECTED
E HISTOLYT AG STL-ACNC: NOT DETECTED
EAEC PAA PLAS AGGR+AATA ST NAA+NON-PRB: NOT DETECTED
EC STX1 + STX2 GENES STL NAA+PROBE: NOT DETECTED
EOSINOPHIL # BLD AUTO: 0.11 10*3/MM3 (ref 0–0.4)
EOSINOPHIL NFR BLD AUTO: 1.4 % (ref 0.3–6.2)
EPEC EAE GENE STL QL NAA+NON-PROBE: NOT DETECTED
ERYTHROCYTE [DISTWIDTH] IN BLOOD BY AUTOMATED COUNT: 12.5 % (ref 12.3–15.4)
ETEC LTA+ST1A+ST1B TOX ST NAA+NON-PROBE: NOT DETECTED
G LAMBLIA DNA SPEC QL NAA+PROBE: NOT DETECTED
GFR SERPL CREATININE-BSD FRML MDRD: >150 ML/MIN/1.73
GLOBULIN UR ELPH-MCNC: 2.8 GM/DL
GLUCOSE SERPL-MCNC: 134 MG/DL (ref 65–99)
HCT VFR BLD AUTO: 39.2 % (ref 37.5–51)
HGB BLD-MCNC: 13 G/DL (ref 13–17.7)
IMM GRANULOCYTES # BLD AUTO: 0.03 10*3/MM3 (ref 0–0.05)
IMM GRANULOCYTES NFR BLD AUTO: 0.4 % (ref 0–0.5)
LYMPHOCYTES # BLD AUTO: 0.3 10*3/MM3 (ref 0.7–3.1)
LYMPHOCYTES NFR BLD AUTO: 3.7 % (ref 19.6–45.3)
MCH RBC QN AUTO: 30.7 PG (ref 26.6–33)
MCHC RBC AUTO-ENTMCNC: 33.2 G/DL (ref 31.5–35.7)
MCV RBC AUTO: 92.7 FL (ref 79–97)
MONOCYTES # BLD AUTO: 0.63 10*3/MM3 (ref 0.1–0.9)
MONOCYTES NFR BLD AUTO: 7.9 % (ref 5–12)
NEUTROPHILS NFR BLD AUTO: 6.94 10*3/MM3 (ref 1.7–7)
NEUTROPHILS NFR BLD AUTO: 86.5 % (ref 42.7–76)
NOROVIRUS GI+II RNA STL QL NAA+NON-PROBE: NOT DETECTED
NRBC BLD AUTO-RTO: 0 /100 WBC (ref 0–0.2)
P SHIGELLOIDES DNA STL QL NAA+PROBE: NOT DETECTED
PLAT MORPH BLD: NORMAL
PLATELET # BLD AUTO: 190 10*3/MM3 (ref 140–450)
PMV BLD AUTO: 11.2 FL (ref 6–12)
POTASSIUM SERPL-SCNC: 3.5 MMOL/L (ref 3.5–5.2)
PROT SERPL-MCNC: 7.2 G/DL (ref 6–8.5)
RBC # BLD AUTO: 4.23 10*6/MM3 (ref 4.14–5.8)
RBC MORPH BLD: NORMAL
RV RNA STL NAA+PROBE: NOT DETECTED
SALMONELLA DNA SPEC QL NAA+PROBE: NOT DETECTED
SAPO I+II+IV+V RNA STL QL NAA+NON-PROBE: NOT DETECTED
SHIGELLA SP+EIEC IPAH STL QL NAA+PROBE: DETECTED
SODIUM SERPL-SCNC: 140 MMOL/L (ref 136–145)
V CHOLERAE DNA SPEC QL NAA+PROBE: NOT DETECTED
VIBRIO DNA SPEC NAA+PROBE: NOT DETECTED
WBC # BLD AUTO: 8.02 10*3/MM3 (ref 3.4–10.8)
WBC MORPH BLD: NORMAL
YERSINIA STL CULT: NOT DETECTED

## 2021-01-20 PROCEDURE — 25010000002 PROMETHAZINE PER 50 MG: Performed by: EMERGENCY MEDICINE

## 2021-01-20 PROCEDURE — 0097U HC BIOFIRE FILMARRAY GI PANEL: CPT | Performed by: EMERGENCY MEDICINE

## 2021-01-20 PROCEDURE — 99283 EMERGENCY DEPT VISIT LOW MDM: CPT

## 2021-01-20 PROCEDURE — 85025 COMPLETE CBC W/AUTO DIFF WBC: CPT | Performed by: EMERGENCY MEDICINE

## 2021-01-20 PROCEDURE — 85007 BL SMEAR W/DIFF WBC COUNT: CPT | Performed by: EMERGENCY MEDICINE

## 2021-01-20 PROCEDURE — 25010000002 ONDANSETRON PER 1 MG: Performed by: EMERGENCY MEDICINE

## 2021-01-20 PROCEDURE — 96375 TX/PRO/DX INJ NEW DRUG ADDON: CPT

## 2021-01-20 PROCEDURE — 96374 THER/PROPH/DIAG INJ IV PUSH: CPT

## 2021-01-20 PROCEDURE — 80053 COMPREHEN METABOLIC PANEL: CPT | Performed by: EMERGENCY MEDICINE

## 2021-01-20 RX ORDER — ONDANSETRON 2 MG/ML
4 INJECTION INTRAMUSCULAR; INTRAVENOUS
Status: DISCONTINUED | OUTPATIENT
Start: 2021-01-20 | End: 2021-01-20 | Stop reason: HOSPADM

## 2021-01-20 RX ORDER — MORPHINE SULFATE 4 MG/ML
4 INJECTION, SOLUTION INTRAMUSCULAR; INTRAVENOUS ONCE
Status: DISCONTINUED | OUTPATIENT
Start: 2021-01-20 | End: 2021-01-20 | Stop reason: HOSPADM

## 2021-01-20 RX ORDER — SODIUM CHLORIDE 0.9 % (FLUSH) 0.9 %
10 SYRINGE (ML) INJECTION AS NEEDED
Status: DISCONTINUED | OUTPATIENT
Start: 2021-01-20 | End: 2021-01-20 | Stop reason: HOSPADM

## 2021-01-20 RX ADMIN — SODIUM CHLORIDE 1000 ML: 9 INJECTION, SOLUTION INTRAVENOUS at 03:48

## 2021-01-20 RX ADMIN — SODIUM CHLORIDE 1000 ML: 9 INJECTION, SOLUTION INTRAVENOUS at 05:56

## 2021-01-20 RX ADMIN — PROMETHAZINE HYDROCHLORIDE 12.5 MG: 25 INJECTION INTRAMUSCULAR; INTRAVENOUS at 05:01

## 2021-01-20 RX ADMIN — ONDANSETRON 4 MG: 2 INJECTION INTRAMUSCULAR; INTRAVENOUS at 03:48

## 2021-01-20 NOTE — DISCHARGE INSTRUCTIONS
Please take your medication as previously prescribed to you from your visit earlier today.  Follow-up with your PCP in a couple days for reevaluation.  If you have any worsening symptoms, unable to tolerate fluids or any further concerns or any recurrent or worsening pain, return back to the emergency department for reevaluation.

## 2021-01-20 NOTE — ED PROVIDER NOTES
Subjective   Willy Vu is a 25 y.o. male who presents to the ED with c/o abdominal pain. The patient reports eating a meal from [employer] last night and he believes this meal instigated his symptoms. Since last night he has had 10/10 lower abdominal pain, worse on the right side, with the intensity worsening at 1400 today with nausea and vomiting. The patient complains of diarrhea and chills but denies chest pain, shortness of breath, back pain, and abnormal urinary symptoms. He has no prior history of abdominal surgeries. There are no other acute complaints at this time.      History provided by:  Patient  Abdominal Pain  Pain location:  RLQ, LLQ and suprapubic  Pain quality: aching    Pain radiates to:  Does not radiate  Pain severity:  Severe (10/10)  Onset quality:  Sudden  Duration:  1 day  Timing:  Constant  Progression:  Worsening  Chronicity:  New  Context: eating and suspicious food intake    Relieved by:  Nothing  Worsened by:  Eating  Ineffective treatments:  Not moving, vomiting, urination, bowel activity, movement, palpation and lying down  Associated symptoms: chills, diarrhea, nausea and vomiting    Associated symptoms: no chest pain, no constipation, no dysuria, no hematuria and no shortness of breath    Risk factors: no alcohol abuse, not elderly, has not had multiple surgeries and no recent hospitalization        Review of Systems   Constitutional: Positive for chills.   Respiratory: Negative for shortness of breath.    Cardiovascular: Negative for chest pain.   Gastrointestinal: Positive for abdominal pain, diarrhea, nausea and vomiting. Negative for blood in stool and constipation.   Genitourinary: Negative for decreased urine volume, difficulty urinating, dysuria, frequency, hematuria and urgency.   Musculoskeletal: Negative for back pain.   All other systems reviewed and are negative.      No past medical history on file.    No Known Allergies    No past surgical history  on file.    No family history on file.    Social History     Socioeconomic History   • Marital status: Single     Spouse name: Not on file   • Number of children: Not on file   • Years of education: Not on file   • Highest education level: Not on file   Tobacco Use   • Smoking status: Never Smoker   • Smokeless tobacco: Never Used   Substance and Sexual Activity   • Alcohol use: Yes   • Drug use: Yes     Types: Marijuana   • Sexual activity: Defer         Objective   Physical Exam  Vitals signs and nursing note reviewed.   Constitutional:       General: He is in acute distress.      Appearance: Normal appearance. He is well-developed. He is ill-appearing. He is not toxic-appearing.   HENT:      Head: Normocephalic and atraumatic.      Mouth/Throat:      Mouth: Mucous membranes are moist.   Eyes:      General: Lids are normal.      Extraocular Movements: Extraocular movements intact.      Conjunctiva/sclera: Conjunctivae normal.      Pupils: Pupils are equal, round, and reactive to light.   Neck:      Musculoskeletal: Full passive range of motion without pain and normal range of motion.      Trachea: Trachea normal.   Cardiovascular:      Rate and Rhythm: Regular rhythm.      Pulses: Normal pulses.      Heart sounds: Normal heart sounds.   Pulmonary:      Effort: Pulmonary effort is normal. No respiratory distress.      Breath sounds: Normal breath sounds. No decreased breath sounds, wheezing, rhonchi or rales.   Abdominal:      General: Bowel sounds are normal.      Palpations: Abdomen is soft.      Tenderness: There is abdominal tenderness in the right lower quadrant and left lower quadrant.   Musculoskeletal: Normal range of motion.   Skin:     General: Skin is warm and dry.      Findings: No rash.   Neurological:      Mental Status: He is alert and oriented to person, place, and time.      Cranial Nerves: No cranial nerve deficit.   Psychiatric:         Speech: Speech normal.         Behavior: Behavior normal.  Behavior is cooperative.         Procedures         ED Course  ED Course as of Jan 19 2349   Tue Jan 19, 2021 2019 Potassium(!): 3.0 [KG]   2019 WBC: 10.63 [KG]   2258 Results are discussed with patient at this time.  Patient reports that he is feeling much better.  Patient be discharged home.  Patient agrees with treatment plan and verbalized understanding.    [KG]      ED Course User Index  [KG] Tri Zendejas, ARACELI     Recent Results (from the past 24 hour(s))   Light Blue Top    Collection Time: 01/19/21  7:24 PM   Result Value Ref Range    Extra Tube hold for add-on    Lavender Top    Collection Time: 01/19/21  7:24 PM   Result Value Ref Range    Extra Tube hold for add-on    Gold Top - SST    Collection Time: 01/19/21  7:24 PM   Result Value Ref Range    Extra Tube Hold for add-ons.    CBC Auto Differential    Collection Time: 01/19/21  7:24 PM    Specimen: Blood   Result Value Ref Range    WBC 10.63 3.40 - 10.80 10*3/mm3    RBC 4.55 4.14 - 5.80 10*6/mm3    Hemoglobin 14.0 13.0 - 17.7 g/dL    Hematocrit 42.7 37.5 - 51.0 %    MCV 93.8 79.0 - 97.0 fL    MCH 30.8 26.6 - 33.0 pg    MCHC 32.8 31.5 - 35.7 g/dL    RDW 12.4 12.3 - 15.4 %    RDW-SD 43.0 37.0 - 54.0 fl    MPV 11.0 6.0 - 12.0 fL    Platelets 215 140 - 450 10*3/mm3    Neutrophil % 85.0 (H) 42.7 - 76.0 %    Lymphocyte % 6.8 (L) 19.6 - 45.3 %    Monocyte % 7.4 5.0 - 12.0 %    Eosinophil % 0.1 (L) 0.3 - 6.2 %    Basophil % 0.2 0.0 - 1.5 %    Immature Grans % 0.5 0.0 - 0.5 %    Neutrophils, Absolute 9.04 (H) 1.70 - 7.00 10*3/mm3    Lymphocytes, Absolute 0.72 0.70 - 3.10 10*3/mm3    Monocytes, Absolute 0.79 0.10 - 0.90 10*3/mm3    Eosinophils, Absolute 0.01 0.00 - 0.40 10*3/mm3    Basophils, Absolute 0.02 0.00 - 0.20 10*3/mm3    Immature Grans, Absolute 0.05 0.00 - 0.05 10*3/mm3    nRBC 0.0 0.0 - 0.2 /100 WBC   Comprehensive Metabolic Panel    Collection Time: 01/19/21  7:25 PM    Specimen: Blood   Result Value Ref Range    Glucose 127 (H) 65 - 99 mg/dL     BUN 13 6 - 20 mg/dL    Creatinine 0.93 0.76 - 1.27 mg/dL    Sodium 140 136 - 145 mmol/L    Potassium 3.0 (L) 3.5 - 5.2 mmol/L    Chloride 101 98 - 107 mmol/L    CO2 22.0 22.0 - 29.0 mmol/L    Calcium 10.0 8.6 - 10.5 mg/dL    Total Protein 7.9 6.0 - 8.5 g/dL    Albumin 5.00 3.50 - 5.20 g/dL    ALT (SGPT) 14 1 - 41 U/L    AST (SGOT) 19 1 - 40 U/L    Alkaline Phosphatase 58 39 - 117 U/L    Total Bilirubin 0.9 0.0 - 1.2 mg/dL    eGFR  African Amer 122 >60 mL/min/1.73    Globulin 2.9 gm/dL    A/G Ratio 1.7 g/dL    BUN/Creatinine Ratio 14.0 7.0 - 25.0    Anion Gap 17.0 (H) 5.0 - 15.0 mmol/L   Lipase    Collection Time: 01/19/21  7:25 PM    Specimen: Blood   Result Value Ref Range    Lipase 12 (L) 13 - 60 U/L   Urinalysis With Microscopic If Indicated (No Culture) - Urine, Clean Catch    Collection Time: 01/19/21  7:25 PM    Specimen: Urine, Clean Catch   Result Value Ref Range    Color, UA Yellow Yellow, Straw    Appearance, UA Clear Clear    pH, UA 6.5 5.0 - 8.0    Specific Gravity, UA 1.028 1.001 - 1.030    Glucose, UA Negative Negative    Ketones, UA Trace (A) Negative    Bilirubin, UA Negative Negative    Blood, UA Negative Negative    Protein, UA Trace (A) Negative    Leuk Esterase, UA Negative Negative    Nitrite, UA Negative Negative    Urobilinogen, UA 0.2 E.U./dL 0.2 - 1.0 E.U./dL   Green Top (Gel)    Collection Time: 01/19/21  7:25 PM   Result Value Ref Range    Extra Tube Hold for add-ons.      Note: In addition to lab results from this visit, the labs listed above may include labs taken at another facility or during a different encounter within the last 24 hours. Please correlate lab times with ED admission and discharge times for further clarification of the services performed during this visit.    CT Abdomen Pelvis With Contrast   Final Result   Negative CT of the abdomen and pelvis.               Signer Name: Ag Moreira MD    Signed: 1/19/2021 9:58 PM    Workstation Name[facility]     Radiology  [facility]        Vitals:    01/19/21 2030 01/19/21 2230 01/19/21 2259 01/19/21 2300   BP: 107/55 118/67  130/60   BP Location:  Right arm     Patient Position:  Lying     Pulse: 70 64 76    Resp: 28 18     Temp:       TempSrc:       SpO2: 95% 97%     Weight:       Height:         Medications   Sodium Chloride (PF) 0.9 % 10 mL (has no administration in time range)   promethazine (PHENERGAN) IVPB 12.5 mg (has no administration in time range)   Morphine sulfate (PF) injection 4 mg (has no administration in time range)   sodium chloride 0.9 % bolus 1,000 mL (0 mL Intravenous Stopped 1/19/21 2109)   ondansetron (ZOFRAN) injection 4 mg (4 mg Intravenous Given 1/19/21 2016)   Morphine sulfate (PF) injection 4 mg (4 mg Intravenous Given 1/19/21 2101)   potassium chloride 10 mEq in 100 mL IVPB (0 mEq Intravenous Stopped 1/19/21 2241)   iopamidol (ISOVUE-300) 61 % injection 100 mL (90 mL Intravenous Given 1/19/21 2134)     ECG/EMG Results (last 24 hours)     ** No results found for the last 24 hours. **        No orders to display              DISCHARGE    Patient discharged in stable condition.    Reviewed implications of results, diagnosis, meds, responsibility to follow up, warning signs and symptoms of possible worsening, potential complications and reasons to return to ER.    Patient/Family voiced understanding of above instructions.    Discussed plan for discharge, as there is no emergent indication for admission.  Pt/family is agreeable and understands need for follow up and possible repeat testing.  Pt/family is aware that discharge does not mean that nothing is wrong but that it indicates no emergency is currently present that requires admission and they must continue care with follow-up as given below or with a physician of their choice.     FOLLOW-UP  Karla Hernandez, APRN  [address] [facility] [address] [phone]               Medication List      New Prescriptions     promethazine 25 MG tablet  Commonly known as: PHENERGAN  Take 1 tablet by mouth Every 6 (Six) Hours As Needed for Nausea or Vomiting.        Changed    dicyclomine 20 MG tablet  Commonly known as: BENTYL  Take 1 tablet by mouth Every 6 (Six) Hours.  What changed:   · when to take this  · reasons to take this           Where to Get Your Medications      You can get these medications from any pharmacy    Bring a paper prescription for each of these medications  · dicyclomine 20 MG tablet  · promethazine 25 MG tablet              COVID-19 RISK SCREEN     1. Has the patient had close contact without PPE with a lab confirmed COVID-19 (+) person or a person under investigation (PUI) for COVID-19 infection?  -- No     2. Has the patient had respiratory symptoms, worsened/new cough and/or SOA, unexplained fever, or sudden loss of smell and/or taste in the past 7 days? --  No    3. Does the patient have baseline higher exposure risk such as working in healthcare field, currently residing in healthcare facility, or ongoing hemodialysis?  --  No                                MDM    Final diagnoses:   Lower abdominal pain   Nausea vomiting and diarrhea   Hypokalemia       Documentation assistance provided by keyla Nieto.  Information recorded by the keyla was done at my direction and has been verified and validated by me.     Delroy Nieto  01/19/21 5048       Delroy Nieto  01/19/21 2300       Tri Zendejas, APRN  01/19/21 2129

## 2021-01-20 NOTE — TELEPHONE ENCOUNTER
Telephone call to patient's number of record at 1840 hrs., advised Shigella and enteroinvasive E. coli positive results.  Provided opportunity to answer any questions he has, phoned in prescriptions for Cipro 500 mg p.o. twice daily x7 days and Flagyl 500 mg 3 times daily x7 days to [facility] pharmacy.

## 2021-01-20 NOTE — TELEPHONE ENCOUNTER
"Patient seen in ER tonight with severe abdominal pain, diarrhea, vomiting.  Symptoms improved in ER but when he got home he started having severe abdominal pain, diarrhea and vomiting again.  States the abdominal pain has been going on since this morning.  Advised per care advice.  Verbalizes understanding.    Reason for Disposition  • [1] SEVERE pain (e.g., excruciating) AND [2] present > 1 hour    Additional Information  • Negative: Shock suspected (e.g., cold/pale/clammy skin, too weak to stand, low BP, rapid pulse)  • Negative: Difficult to awaken or acting confused (e.g., disoriented, slurred speech)  • Negative: Passed out (i.e., lost consciousness, collapsed and was not responding)  • Negative: Sounds like a life-threatening emergency to the triager  • Negative: Chest pain  • Negative: Pain is mainly in upper abdomen  (if needed ask: \"is it mainly above the belly button?\")  • Negative: Followed an abdomen (stomach) injury    Answer Assessment - Initial Assessment Questions  1. LOCATION: \"Where does it hurt?\"       Lower abdomen  2. RADIATION: \"Does the pain shoot anywhere else?\" (e.g., chest, back)      No  3. ONSET: \"When did the pain begin?\" (Minutes, hours or days ago)       This morning  4. SUDDEN: \"Gradual or sudden onset?\"      Gradual  5. PATTERN \"Does the pain come and go, or is it constant?\"     - If constant: \"Is it getting better, staying the same, or worsening?\"       (Note: Constant means the pain never goes away completely; most serious pain is constant and it progresses)      - If intermittent: \"How long does it last?\" \"Do you have pain now?\"      (Note: Intermittent means the pain goes away completely between bouts)      Intermittent  6. SEVERITY: \"How bad is the pain?\"  (e.g., Scale 1-10; mild, moderate, or severe)     - MILD (1-3): doesn't interfere with normal activities, abdomen soft and not tender to touch      - MODERATE (4-7): interferes with normal activities or awakens from sleep, " "tender to touch      - SEVERE (8-10): excruciating pain, doubled over, unable to do any normal activities        Severe  7. RECURRENT SYMPTOM: \"Have you ever had this type of abdominal pain before?\" If so, ask: \"When was the last time?\" and \"What happened that time?\"       No  8. CAUSE: \"What do you think is causing the abdominal pain?\"      Unknown  9. RELIEVING/AGGRAVATING FACTORS: \"What makes it better or worse?\" (e.g., movement, antacids, bowel movement)      Laying down in a certain way  10. OTHER SYMPTOMS: \"Has there been any vomiting, diarrhea, constipation, or urine problems?\"        Vomiting and diarrhea    Protocols used: ABDOMINAL PAIN - MALE-ADULT-    "

## 2021-01-21 ENCOUNTER — HOSPITAL ENCOUNTER (EMERGENCY)
Facility: HOSPITAL | Age: 26
Discharge: HOME | End: 2021-01-22
Attending: EMERGENCY MEDICINE | Admitting: EMERGENCY MEDICINE

## 2021-01-21 DIAGNOSIS — R11.2 NAUSEA AND VOMITING, INTRACTABILITY OF VOMITING NOT SPECIFIED, UNSPECIFIED VOMITING TYPE: Primary | ICD-10-CM

## 2021-01-21 LAB
ALBUMIN SERPL-MCNC: 4.5 G/DL (ref 3.5–5.2)
ALBUMIN/GLOB SERPL: 1.5 G/DL
ALP SERPL-CCNC: 54 U/L (ref 39–117)
ALT SERPL W P-5'-P-CCNC: 12 U/L (ref 1–41)
AMPHET+METHAMPHET UR QL: NEGATIVE
AMPHETAMINES UR QL: NEGATIVE
ANION GAP SERPL CALCULATED.3IONS-SCNC: 14 MMOL/L (ref 5–15)
AST SERPL-CCNC: 18 U/L (ref 1–40)
BARBITURATES UR QL SCN: NEGATIVE
BASOPHILS # BLD AUTO: 0.03 10*3/MM3 (ref 0–0.2)
BASOPHILS NFR BLD AUTO: 0.4 % (ref 0–1.5)
BENZODIAZ UR QL SCN: NEGATIVE
BILIRUB SERPL-MCNC: 0.3 MG/DL (ref 0–1.2)
BILIRUB UR QL STRIP: NEGATIVE
BUN SERPL-MCNC: 8 MG/DL (ref 6–20)
BUN/CREAT SERPL: 9.1 (ref 7–25)
BUPRENORPHINE SERPL-MCNC: NEGATIVE NG/ML
CALCIUM SPEC-SCNC: 9.6 MG/DL (ref 8.6–10.5)
CANNABINOIDS SERPL QL: POSITIVE
CHLORIDE SERPL-SCNC: 103 MMOL/L (ref 98–107)
CLARITY UR: CLEAR
CO2 SERPL-SCNC: 24 MMOL/L (ref 22–29)
COCAINE UR QL: NEGATIVE
COLOR UR: YELLOW
CREAT SERPL-MCNC: 0.88 MG/DL (ref 0.76–1.27)
DEPRECATED RDW RBC AUTO: 43.8 FL (ref 37–54)
EOSINOPHIL # BLD AUTO: 0.01 10*3/MM3 (ref 0–0.4)
EOSINOPHIL NFR BLD AUTO: 0.1 % (ref 0.3–6.2)
ERYTHROCYTE [DISTWIDTH] IN BLOOD BY AUTOMATED COUNT: 12.4 % (ref 12.3–15.4)
GFR SERPL CREATININE-BSD FRML MDRD: 130 ML/MIN/1.73
GLOBULIN UR ELPH-MCNC: 3 GM/DL
GLUCOSE SERPL-MCNC: 110 MG/DL (ref 65–99)
GLUCOSE UR STRIP-MCNC: NEGATIVE MG/DL
HCT VFR BLD AUTO: 41.6 % (ref 37.5–51)
HGB BLD-MCNC: 13.7 G/DL (ref 13–17.7)
HGB UR QL STRIP.AUTO: NEGATIVE
HOLD SPECIMEN: NORMAL
HOLD SPECIMEN: NORMAL
IMM GRANULOCYTES # BLD AUTO: 0.01 10*3/MM3 (ref 0–0.05)
IMM GRANULOCYTES NFR BLD AUTO: 0.1 % (ref 0–0.5)
KETONES UR QL STRIP: ABNORMAL
LEUKOCYTE ESTERASE UR QL STRIP.AUTO: NEGATIVE
LIPASE SERPL-CCNC: 13 U/L (ref 13–60)
LYMPHOCYTES # BLD AUTO: 0.44 10*3/MM3 (ref 0.7–3.1)
LYMPHOCYTES NFR BLD AUTO: 6.5 % (ref 19.6–45.3)
MCH RBC QN AUTO: 31.4 PG (ref 26.6–33)
MCHC RBC AUTO-ENTMCNC: 32.9 G/DL (ref 31.5–35.7)
MCV RBC AUTO: 95.4 FL (ref 79–97)
METHADONE UR QL SCN: NEGATIVE
MONOCYTES # BLD AUTO: 0.68 10*3/MM3 (ref 0.1–0.9)
MONOCYTES NFR BLD AUTO: 10.1 % (ref 5–12)
NEUTROPHILS NFR BLD AUTO: 5.57 10*3/MM3 (ref 1.7–7)
NEUTROPHILS NFR BLD AUTO: 82.8 % (ref 42.7–76)
NITRITE UR QL STRIP: NEGATIVE
NRBC BLD AUTO-RTO: 0 /100 WBC (ref 0–0.2)
OPIATES UR QL: NEGATIVE
OXYCODONE UR QL SCN: NEGATIVE
PCP UR QL SCN: NEGATIVE
PH UR STRIP.AUTO: 7.5 [PH] (ref 5–8)
PLATELET # BLD AUTO: 192 10*3/MM3 (ref 140–450)
PMV BLD AUTO: 11 FL (ref 6–12)
POTASSIUM SERPL-SCNC: 3.1 MMOL/L (ref 3.5–5.2)
PROPOXYPH UR QL: NEGATIVE
PROT SERPL-MCNC: 7.5 G/DL (ref 6–8.5)
PROT UR QL STRIP: ABNORMAL
RBC # BLD AUTO: 4.36 10*6/MM3 (ref 4.14–5.8)
SODIUM SERPL-SCNC: 141 MMOL/L (ref 136–145)
SP GR UR STRIP: 1.02 (ref 1–1.03)
TRICYCLICS UR QL SCN: NEGATIVE
UROBILINOGEN UR QL STRIP: ABNORMAL
WBC # BLD AUTO: 6.74 10*3/MM3 (ref 3.4–10.8)
WHOLE BLOOD HOLD SPECIMEN: NORMAL
WHOLE BLOOD HOLD SPECIMEN: NORMAL

## 2021-01-21 PROCEDURE — 83690 ASSAY OF LIPASE: CPT

## 2021-01-21 PROCEDURE — 96375 TX/PRO/DX INJ NEW DRUG ADDON: CPT

## 2021-01-21 PROCEDURE — 25010000002 PROMETHAZINE PER 50 MG: Performed by: NURSE PRACTITIONER

## 2021-01-21 PROCEDURE — 80306 DRUG TEST PRSMV INSTRMNT: CPT | Performed by: NURSE PRACTITIONER

## 2021-01-21 PROCEDURE — 99283 EMERGENCY DEPT VISIT LOW MDM: CPT

## 2021-01-21 PROCEDURE — 80053 COMPREHEN METABOLIC PANEL: CPT

## 2021-01-21 PROCEDURE — 81003 URINALYSIS AUTO W/O SCOPE: CPT | Performed by: EMERGENCY MEDICINE

## 2021-01-21 PROCEDURE — 25010000002 LORAZEPAM PER 2 MG: Performed by: EMERGENCY MEDICINE

## 2021-01-21 PROCEDURE — 85025 COMPLETE CBC W/AUTO DIFF WBC: CPT

## 2021-01-21 PROCEDURE — 96374 THER/PROPH/DIAG INJ IV PUSH: CPT

## 2021-01-21 RX ORDER — SODIUM CHLORIDE 9 MG/ML
10 INJECTION INTRAVENOUS AS NEEDED
Status: DISCONTINUED | OUTPATIENT
Start: 2021-01-21 | End: 2021-01-22 | Stop reason: HOSPADM

## 2021-01-21 RX ORDER — LORAZEPAM 2 MG/ML
1 INJECTION INTRAMUSCULAR ONCE
Status: COMPLETED | OUTPATIENT
Start: 2021-01-21 | End: 2021-01-21

## 2021-01-21 RX ADMIN — SODIUM CHLORIDE 1000 ML: 9 INJECTION, SOLUTION INTRAVENOUS at 21:52

## 2021-01-21 RX ADMIN — LORAZEPAM 1 MG: 2 INJECTION INTRAMUSCULAR; INTRAVENOUS at 22:50

## 2021-01-21 RX ADMIN — PROMETHAZINE HYDROCHLORIDE 12.5 MG: 25 INJECTION INTRAMUSCULAR; INTRAVENOUS at 21:46

## 2021-01-21 RX ADMIN — SODIUM CHLORIDE 1000 ML: 9 INJECTION, SOLUTION INTRAVENOUS at 22:50

## 2021-01-22 VITALS
HEIGHT: 67 IN | WEIGHT: 123 LBS | TEMPERATURE: 98.6 F | DIASTOLIC BLOOD PRESSURE: 63 MMHG | OXYGEN SATURATION: 98 % | RESPIRATION RATE: 16 BRPM | HEART RATE: 84 BPM | BODY MASS INDEX: 19.3 KG/M2 | SYSTOLIC BLOOD PRESSURE: 126 MMHG

## 2021-01-22 NOTE — ED PROVIDER NOTES
Subjective   History of Present Illness    Review of Systems    History reviewed. No pertinent past medical history.    No Known Allergies    History reviewed. No pertinent surgical history.    History reviewed. No pertinent family history.    Social History     Socioeconomic History   • Marital status: Single     Spouse name: Not on file   • Number of children: Not on file   • Years of education: Not on file   • Highest education level: Not on file   Tobacco Use   • Smoking status: Never Smoker   • Smokeless tobacco: Never Used   Substance and Sexual Activity   • Alcohol use: Yes   • Drug use: Yes     Types: Marijuana   • Sexual activity: Defer         Objective   Physical Exam    Procedures         ED Course  ED Course as of Jan 22 0042   Thu Jan 21, 2021 2130 Potassium(!): 3.1 [KG]   2156 THC Screen, Urine(!): Positive [KG]   Fri Jan 22, 2021 0032 Patient will be discharged home.  Patient to follow-up with his PCP, GI.  Patient to continue taking his medications as previously prescribed.  Patient agrees with treatment plan and verbalized understanding.      [KG]      ED Course User Index  [KG] Tri Zendejas, APRN               DISCHARGE    Patient discharged in stable condition.    Reviewed implications of results, diagnosis, meds, responsibility to follow up, warning signs and symptoms of possible worsening, potential complications and reasons to return to ER.    Patient/Family voiced understanding of above instructions.    Discussed plan for discharge, as there is no emergent indication for admission.  Pt/family is agreeable and understands need for follow up and possible repeat testing.  Pt/family is aware that discharge does not mean that nothing is wrong but that it indicates no emergency is currently present that requires admission and they must continue care with follow-up as given below or with a physician of their choice.     FOLLOW-UP  Karla Hernandez, ARACELI  [address] [facility]  [place] [address]  [phone]          Jerry Schmitz MD  [address] [facility]  [address]  [place] [id number] [phone]               Medication List      No changes were made to your prescriptions during this visit.                                    MDM    Final diagnoses:   Nausea and vomiting, intractability of vomiting not specified, unspecified vomiting type       Documentation assistance provided by scribe Lauren K Collett.  Information recorded by the scribe was done at my direction and has been verified and validated by me.     Collett, Lauren K  01/22/21 0042

## 2021-01-22 NOTE — ED PROVIDER NOTES
Subjective   Willy Vu is a 25 year old male who presents to the ED due to vomiting. He reports nausea, diarrhea and lower abdominal pain while denying fever, chills, chest pain, and shortness of breath. He has taken Phenergan with minimal relief. The patient was seen in the ED 1 day ago for his current symptoms, and since yesterday, he is being treated with Cipro and Flagyl following a stool culture positive for E. Coli and Shigella. There are no other acute symptoms present at this time.      History provided by:  Patient  Vomiting  The primary symptoms include fatigue, abdominal pain, nausea, vomiting and diarrhea. Primary symptoms do not include fever or dysuria. The illness began yesterday. The onset was sudden. The problem has not changed since onset.  Nausea began yesterday.   The vomiting began yesterday.   The illness does not include chills.       Review of Systems   Constitutional: Positive for fatigue. Negative for chills and fever.   Respiratory: Negative for shortness of breath.    Cardiovascular: Negative for chest pain.   Gastrointestinal: Positive for abdominal pain, diarrhea, nausea and vomiting.        Lower abdominal pain.   Genitourinary: Negative for decreased urine volume, difficulty urinating and dysuria.   Neurological: Negative for dizziness and weakness.   All other systems reviewed and are negative.      History reviewed. No pertinent past medical history.    No Known Allergies    History reviewed. No pertinent surgical history.    History reviewed. No pertinent family history.    Social History     Socioeconomic History   • Marital status: Single     Spouse name: Not on file   • Number of children: Not on file   • Years of education: Not on file   • Highest education level: Not on file   Tobacco Use   • Smoking status: Never Smoker   • Smokeless tobacco: Never Used   Substance and Sexual Activity   • Alcohol use: Yes   • Drug use: Yes     Types: Marijuana   • Sexual  activity: Defer         Objective   Physical Exam  Vitals signs and nursing note reviewed.   Constitutional:       Appearance: Normal appearance. He is well-developed. He is not toxic-appearing.   HENT:      Head: Normocephalic and atraumatic.      Nose: Nose normal.      Mouth/Throat:      Mouth: Mucous membranes are moist.   Eyes:      General: Lids are normal.      Extraocular Movements: Extraocular movements intact.      Conjunctiva/sclera: Conjunctivae normal.      Pupils: Pupils are equal, round, and reactive to light.   Neck:      Musculoskeletal: Full passive range of motion without pain and normal range of motion.      Trachea: Trachea normal.   Cardiovascular:      Rate and Rhythm: Regular rhythm.      Pulses: Normal pulses.      Heart sounds: Normal heart sounds.   Pulmonary:      Effort: Pulmonary effort is normal. No respiratory distress.      Breath sounds: Normal breath sounds. No decreased breath sounds, wheezing, rhonchi or rales.   Abdominal:      General: Bowel sounds are normal.      Palpations: Abdomen is soft.      Tenderness: There is no abdominal tenderness.   Musculoskeletal: Normal range of motion.   Skin:     General: Skin is warm and dry.      Findings: No rash.   Neurological:      Mental Status: He is alert and oriented to person, place, and time.      Cranial Nerves: No cranial nerve deficit.   Psychiatric:         Speech: Speech normal.         Behavior: Behavior normal. Behavior is cooperative.         Procedures         ED Course  ED Course as of Jan 22 0033   Thu Jan 21, 2021 2130 Potassium(!): 3.1 [KG]   2156 THC Screen, Urine(!): Positive [KG]   Fri Jan 22, 2021   0032 Patient will be discharged home.  Patient to follow-up with his PCP, GI.  Patient to continue taking his medications as previously prescribed.  Patient agrees with treatment plan and verbalized understanding.      [KG]      ED Course User Index  [KG] Tri Zendejas, ARACELI     Recent Results (from the past 24  hour(s))   Comprehensive Metabolic Panel    Collection Time: 01/21/21  5:52 PM    Specimen: Blood   Result Value Ref Range    Glucose 110 (H) 65 - 99 mg/dL    BUN 8 6 - 20 mg/dL    Creatinine 0.88 0.76 - 1.27 mg/dL    Sodium 141 136 - 145 mmol/L    Potassium 3.1 (L) 3.5 - 5.2 mmol/L    Chloride 103 98 - 107 mmol/L    CO2 24.0 22.0 - 29.0 mmol/L    Calcium 9.6 8.6 - 10.5 mg/dL    Total Protein 7.5 6.0 - 8.5 g/dL    Albumin 4.50 3.50 - 5.20 g/dL    ALT (SGPT) 12 1 - 41 U/L    AST (SGOT) 18 1 - 40 U/L    Alkaline Phosphatase 54 39 - 117 U/L    Total Bilirubin 0.3 0.0 - 1.2 mg/dL    eGFR  African Amer 130 >60 mL/min/1.73    Globulin 3.0 gm/dL    A/G Ratio 1.5 g/dL    BUN/Creatinine Ratio 9.1 7.0 - 25.0    Anion Gap 14.0 5.0 - 15.0 mmol/L   Lipase    Collection Time: 01/21/21  5:52 PM    Specimen: Blood   Result Value Ref Range    Lipase 13 13 - 60 U/L   Light Blue Top    Collection Time: 01/21/21  5:52 PM   Result Value Ref Range    Extra Tube hold for add-on    Green Top (Gel)    Collection Time: 01/21/21  5:52 PM   Result Value Ref Range    Extra Tube Hold for add-ons.    Lavender Top    Collection Time: 01/21/21  5:52 PM   Result Value Ref Range    Extra Tube hold for add-on    Gold Top - SST    Collection Time: 01/21/21  5:52 PM   Result Value Ref Range    Extra Tube Hold for add-ons.    CBC Auto Differential    Collection Time: 01/21/21  5:52 PM    Specimen: Blood   Result Value Ref Range    WBC 6.74 3.40 - 10.80 10*3/mm3    RBC 4.36 4.14 - 5.80 10*6/mm3    Hemoglobin 13.7 13.0 - 17.7 g/dL    Hematocrit 41.6 37.5 - 51.0 %    MCV 95.4 79.0 - 97.0 fL    MCH 31.4 26.6 - 33.0 pg    MCHC 32.9 31.5 - 35.7 g/dL    RDW 12.4 12.3 - 15.4 %    RDW-SD 43.8 37.0 - 54.0 fl    MPV 11.0 6.0 - 12.0 fL    Platelets 192 140 - 450 10*3/mm3    Neutrophil % 82.8 (H) 42.7 - 76.0 %    Lymphocyte % 6.5 (L) 19.6 - 45.3 %    Monocyte % 10.1 5.0 - 12.0 %    Eosinophil % 0.1 (L) 0.3 - 6.2 %    Basophil % 0.4 0.0 - 1.5 %    Immature Grans % 0.1  0.0 - 0.5 %    Neutrophils, Absolute 5.57 1.70 - 7.00 10*3/mm3    Lymphocytes, Absolute 0.44 (L) 0.70 - 3.10 10*3/mm3    Monocytes, Absolute 0.68 0.10 - 0.90 10*3/mm3    Eosinophils, Absolute 0.01 0.00 - 0.40 10*3/mm3    Basophils, Absolute 0.03 0.00 - 0.20 10*3/mm3    Immature Grans, Absolute 0.01 0.00 - 0.05 10*3/mm3    nRBC 0.0 0.0 - 0.2 /100 WBC   Urinalysis With Microscopic If Indicated (No Culture) - Urine, Clean Catch    Collection Time: 01/21/21  9:05 PM    Specimen: Urine, Clean Catch   Result Value Ref Range    Color, UA Yellow Yellow, Straw    Appearance, UA Clear Clear    pH, UA 7.5 5.0 - 8.0    Specific Gravity, UA 1.021 1.001 - 1.030    Glucose, UA Negative Negative    Ketones, UA 15 mg/dL (1+) (A) Negative    Bilirubin, UA Negative Negative    Blood, UA Negative Negative    Protein, UA Trace (A) Negative    Leuk Esterase, UA Negative Negative    Nitrite, UA Negative Negative    Urobilinogen, UA 0.2 E.U./dL 0.2 - 1.0 E.U./dL   Urine Drug Screen - Urine, Clean Catch    Collection Time: 01/21/21  9:05 PM    Specimen: Urine, Clean Catch   Result Value Ref Range    THC, Screen, Urine Positive (A) Negative    Phencyclidine (PCP), Urine Negative Negative    Cocaine Screen, Urine Negative Negative    Methamphetamine, Ur Negative Negative    Opiate Screen Negative Negative    Amphetamine Screen, Urine Negative Negative    Benzodiazepine Screen, Urine Negative Negative    Tricyclic Antidepressants Screen Negative Negative    Methadone Screen, Urine Negative Negative    Barbiturates Screen, Urine Negative Negative    Oxycodone Screen, Urine Negative Negative    Propoxyphene Screen Negative Negative    Buprenorphine, Screen, Urine Negative Negative     Note: In addition to lab results from this visit, the labs listed above may include labs taken at another facility or during a different encounter within the last 24 hours. Please correlate lab times with ED admission and discharge times for further clarification  of the services performed during this visit.    No orders to display     Vitals:    01/21/21 2225 01/21/21 2328 01/21/21 2330 01/21/21 2343   BP:   134/79    Pulse: 85   84   Resp:       Temp:       SpO2:  99% 99%    Weight:       Height:         Medications   Sodium Chloride (PF) 0.9 % 10 mL (has no administration in time range)   promethazine (PHENERGAN) IVPB 12.5 mg (0 mg Intravenous Stopped 1/21/21 2323)   sodium chloride 0.9 % bolus 1,000 mL (1,000 mL Intravenous New Bag 1/21/21 2152)   sodium chloride 0.9 % bolus 1,000 mL (1,000 mL Intravenous New Bag 1/21/21 2250)   LORazepam (ATIVAN) injection 1 mg (1 mg Intravenous Given 1/21/21 2250)     ECG/EMG Results (last 24 hours)     ** No results found for the last 24 hours. **        No orders to display              DISCHARGE    Patient discharged in stable condition.    Reviewed implications of results, diagnosis, meds, responsibility to follow up, warning signs and symptoms of possible worsening, potential complications and reasons to return to ER.    Patient/Family voiced understanding of above instructions.    Discussed plan for discharge, as there is no emergent indication for admission.  Pt/family is agreeable and understands need for follow up and possible repeat testing.  Pt/family is aware that discharge does not mean that nothing is wrong but that it indicates no emergency is currently present that requires admission and they must continue care with follow-up as given below or with a physician of their choice.     FOLLOW-UP  Karla Hernandez, APRN  [address] [id number] [phone]          Jerry Schmitz MD  [address] [facility] [address]  [place] [id number]  [phone]               Medication List      No changes were made to your prescriptions during this visit.                                       MDM    Final diagnoses:   Nausea and vomiting, intractability of vomiting not specified, unspecified vomiting type        Documentation assistance provided by scribe Lauren K Collett.  Information recorded by the scribe was done at my direction and has been verified and validated by me.     Collett, Lauren K  01/21/21 2202       Tri Zendejas, ARACELI  01/22/21 0033

## 2021-02-03 LAB — BACTERIA SPEC AEROBE CULT: NORMAL

## 2022-05-20 ENCOUNTER — HOSPITAL ENCOUNTER (EMERGENCY)
Facility: HOSPITAL | Age: 27
Discharge: LEFT BEFORE DISCHARGE | End: 2022-05-20

## 2022-05-20 VITALS
HEART RATE: 82 BPM | DIASTOLIC BLOOD PRESSURE: 89 MMHG | TEMPERATURE: 97.4 F | SYSTOLIC BLOOD PRESSURE: 133 MMHG | BODY MASS INDEX: 16.18 KG/M2 | OXYGEN SATURATION: 99 % | RESPIRATION RATE: 18 BRPM | WEIGHT: 113 LBS | HEIGHT: 70 IN

## 2022-05-20 PROCEDURE — 99211 OFF/OP EST MAY X REQ PHY/QHP: CPT

## 2022-05-21 ENCOUNTER — APPOINTMENT (OUTPATIENT)
Dept: CT IMAGING | Facility: HOSPITAL | Age: 27
End: 2022-05-21

## 2022-05-21 ENCOUNTER — HOSPITAL ENCOUNTER (EMERGENCY)
Facility: HOSPITAL | Age: 27
Discharge: HOME | End: 2022-05-21
Attending: EMERGENCY MEDICINE | Admitting: EMERGENCY MEDICINE

## 2022-05-21 VITALS
WEIGHT: 113 LBS | TEMPERATURE: 98.6 F | DIASTOLIC BLOOD PRESSURE: 66 MMHG | OXYGEN SATURATION: 100 % | SYSTOLIC BLOOD PRESSURE: 139 MMHG | HEIGHT: 67 IN | RESPIRATION RATE: 18 BRPM | HEART RATE: 73 BPM | BODY MASS INDEX: 17.74 KG/M2

## 2022-05-21 DIAGNOSIS — K52.9 ENTEROCOLITIS: Primary | ICD-10-CM

## 2022-05-21 LAB
ADV 40+41 DNA STL QL NAA+NON-PROBE: NOT DETECTED
ALBUMIN SERPL-MCNC: 5 G/DL (ref 3.5–5.2)
ALBUMIN/GLOB SERPL: 1.8 G/DL
ALP SERPL-CCNC: 71 U/L (ref 39–117)
ALT SERPL W P-5'-P-CCNC: 9 U/L (ref 1–41)
ANION GAP SERPL CALCULATED.3IONS-SCNC: 12 MMOL/L (ref 5–15)
AST SERPL-CCNC: 18 U/L (ref 1–40)
ASTRO TYP 1-8 RNA STL QL NAA+NON-PROBE: NOT DETECTED
BASOPHILS # BLD AUTO: 0.03 10*3/MM3 (ref 0–0.2)
BASOPHILS NFR BLD AUTO: 0.4 % (ref 0–1.5)
BILIRUB SERPL-MCNC: 0.5 MG/DL (ref 0–1.2)
BILIRUB UR QL STRIP: NEGATIVE
BUN SERPL-MCNC: 9 MG/DL (ref 6–20)
BUN/CREAT SERPL: 10.5 (ref 7–25)
C CAYETANENSIS DNA STL QL NAA+NON-PROBE: NOT DETECTED
C COLI+JEJ+UPSA DNA STL QL NAA+NON-PROBE: NOT DETECTED
CALCIUM SPEC-SCNC: 10.1 MG/DL (ref 8.6–10.5)
CHLORIDE SERPL-SCNC: 106 MMOL/L (ref 98–107)
CLARITY UR: CLEAR
CO2 SERPL-SCNC: 22 MMOL/L (ref 22–29)
COLOR UR: YELLOW
CREAT SERPL-MCNC: 0.86 MG/DL (ref 0.76–1.27)
CRYPTOSP DNA STL QL NAA+NON-PROBE: NOT DETECTED
DEPRECATED RDW RBC AUTO: 41.7 FL (ref 37–54)
E HISTOLYT DNA STL QL NAA+NON-PROBE: NOT DETECTED
EAEC PAA PLAS AGGR+AATA ST NAA+NON-PRB: NOT DETECTED
EC STX1+STX2 GENES STL QL NAA+NON-PROBE: NOT DETECTED
EGFRCR SERPLBLD CKD-EPI 2021: 124 ML/MIN/1.73
EOSINOPHIL # BLD AUTO: 0.09 10*3/MM3 (ref 0–0.4)
EOSINOPHIL NFR BLD AUTO: 1.1 % (ref 0.3–6.2)
EPEC EAE GENE STL QL NAA+NON-PROBE: NOT DETECTED
ERYTHROCYTE [DISTWIDTH] IN BLOOD BY AUTOMATED COUNT: 12.3 % (ref 12.3–15.4)
ETEC LTA+ST1A+ST1B TOX ST NAA+NON-PROBE: NOT DETECTED
G LAMBLIA DNA STL QL NAA+NON-PROBE: NOT DETECTED
GLOBULIN UR ELPH-MCNC: 2.8 GM/DL
GLUCOSE SERPL-MCNC: 117 MG/DL (ref 65–99)
GLUCOSE UR STRIP-MCNC: NEGATIVE MG/DL
HCT VFR BLD AUTO: 45 % (ref 37.5–51)
HGB BLD-MCNC: 15.4 G/DL (ref 13–17.7)
HGB UR QL STRIP.AUTO: NEGATIVE
HOLD SPECIMEN: NORMAL
IMM GRANULOCYTES # BLD AUTO: 0.02 10*3/MM3 (ref 0–0.05)
IMM GRANULOCYTES NFR BLD AUTO: 0.2 % (ref 0–0.5)
KETONES UR QL STRIP: ABNORMAL
LEUKOCYTE ESTERASE UR QL STRIP.AUTO: NEGATIVE
LIPASE SERPL-CCNC: 15 U/L (ref 13–60)
LYMPHOCYTES # BLD AUTO: 0.72 10*3/MM3 (ref 0.7–3.1)
LYMPHOCYTES NFR BLD AUTO: 8.9 % (ref 19.6–45.3)
MCH RBC QN AUTO: 31.5 PG (ref 26.6–33)
MCHC RBC AUTO-ENTMCNC: 34.2 G/DL (ref 31.5–35.7)
MCV RBC AUTO: 92 FL (ref 79–97)
MONOCYTES # BLD AUTO: 1.37 10*3/MM3 (ref 0.1–0.9)
MONOCYTES NFR BLD AUTO: 17 % (ref 5–12)
NEUTROPHILS NFR BLD AUTO: 5.83 10*3/MM3 (ref 1.7–7)
NEUTROPHILS NFR BLD AUTO: 72.4 % (ref 42.7–76)
NITRITE UR QL STRIP: NEGATIVE
NOROVIRUS GI+II RNA STL QL NAA+NON-PROBE: NOT DETECTED
NRBC BLD AUTO-RTO: 0 /100 WBC (ref 0–0.2)
P SHIGELLOIDES DNA STL QL NAA+NON-PROBE: NOT DETECTED
PH UR STRIP.AUTO: 5.5 [PH] (ref 5–8)
PLATELET # BLD AUTO: 241 10*3/MM3 (ref 140–450)
PMV BLD AUTO: 10.9 FL (ref 6–12)
POTASSIUM SERPL-SCNC: 3.8 MMOL/L (ref 3.5–5.2)
PROT SERPL-MCNC: 7.8 G/DL (ref 6–8.5)
PROT UR QL STRIP: NEGATIVE
RBC # BLD AUTO: 4.89 10*6/MM3 (ref 4.14–5.8)
RVA RNA STL QL NAA+NON-PROBE: NOT DETECTED
S ENT+BONG DNA STL QL NAA+NON-PROBE: NOT DETECTED
SAPO I+II+IV+V RNA STL QL NAA+NON-PROBE: NOT DETECTED
SHIGELLA SP+EIEC IPAH ST NAA+NON-PROBE: DETECTED
SODIUM SERPL-SCNC: 140 MMOL/L (ref 136–145)
SP GR UR STRIP: 1.03 (ref 1–1.03)
UROBILINOGEN UR QL STRIP: ABNORMAL
V CHOL+PARA+VUL DNA STL QL NAA+NON-PROBE: NOT DETECTED
V CHOLERAE DNA STL QL NAA+NON-PROBE: NOT DETECTED
WBC NRBC COR # BLD: 8.06 10*3/MM3 (ref 3.4–10.8)
WHOLE BLOOD HOLD COAG: NORMAL
WHOLE BLOOD HOLD SPECIMEN: NORMAL
Y ENTEROCOL DNA STL QL NAA+NON-PROBE: NOT DETECTED

## 2022-05-21 PROCEDURE — 25010000002 IOPAMIDOL 61 % SOLUTION: Performed by: EMERGENCY MEDICINE

## 2022-05-21 PROCEDURE — 99283 EMERGENCY DEPT VISIT LOW MDM: CPT

## 2022-05-21 PROCEDURE — 99284 EMERGENCY DEPT VISIT MOD MDM: CPT

## 2022-05-21 PROCEDURE — 85025 COMPLETE CBC W/AUTO DIFF WBC: CPT | Performed by: EMERGENCY MEDICINE

## 2022-05-21 PROCEDURE — 25010000002 PROCHLORPERAZINE 10 MG/2ML SOLUTION: Performed by: EMERGENCY MEDICINE

## 2022-05-21 PROCEDURE — 87507 IADNA-DNA/RNA PROBE TQ 12-25: CPT | Performed by: EMERGENCY MEDICINE

## 2022-05-21 PROCEDURE — 80053 COMPREHEN METABOLIC PANEL: CPT | Performed by: EMERGENCY MEDICINE

## 2022-05-21 PROCEDURE — 81003 URINALYSIS AUTO W/O SCOPE: CPT | Performed by: EMERGENCY MEDICINE

## 2022-05-21 PROCEDURE — 96374 THER/PROPH/DIAG INJ IV PUSH: CPT

## 2022-05-21 PROCEDURE — 74177 CT ABD & PELVIS W/CONTRAST: CPT

## 2022-05-21 PROCEDURE — 83690 ASSAY OF LIPASE: CPT | Performed by: EMERGENCY MEDICINE

## 2022-05-21 RX ORDER — PROCHLORPERAZINE EDISYLATE 5 MG/ML
5 INJECTION INTRAMUSCULAR; INTRAVENOUS ONCE
Status: COMPLETED | OUTPATIENT
Start: 2022-05-21 | End: 2022-05-21

## 2022-05-21 RX ORDER — PROCHLORPERAZINE 25 MG
25 SUPPOSITORY, RECTAL RECTAL ONCE
Status: COMPLETED | OUTPATIENT
Start: 2022-05-21 | End: 2022-05-21

## 2022-05-21 RX ORDER — SODIUM CHLORIDE 0.9 % (FLUSH) 0.9 %
10 SYRINGE (ML) INJECTION AS NEEDED
Status: DISCONTINUED | OUTPATIENT
Start: 2022-05-21 | End: 2022-05-21 | Stop reason: HOSPADM

## 2022-05-21 RX ORDER — CIPROFLOXACIN 500 MG/1
500 TABLET, FILM COATED ORAL 2 TIMES DAILY
Qty: 6 TABLET | Refills: 0 | Status: SHIPPED | OUTPATIENT
Start: 2022-05-21 | End: 2022-05-24

## 2022-05-21 RX ORDER — METRONIDAZOLE 500 MG/1
500 TABLET ORAL ONCE
Status: COMPLETED | OUTPATIENT
Start: 2022-05-21 | End: 2022-05-21

## 2022-05-21 RX ORDER — SODIUM CHLORIDE 9 MG/ML
10 INJECTION INTRAVENOUS AS NEEDED
Status: DISCONTINUED | OUTPATIENT
Start: 2022-05-21 | End: 2022-05-21 | Stop reason: HOSPADM

## 2022-05-21 RX ORDER — METRONIDAZOLE 500 MG/1
250 TABLET ORAL 2 TIMES DAILY
Qty: 14 TABLET | Refills: 0 | Status: SHIPPED | OUTPATIENT
Start: 2022-05-21 | End: 2022-05-26

## 2022-05-21 RX ORDER — PROMETHAZINE HYDROCHLORIDE 25 MG/1
25 TABLET ORAL EVERY 6 HOURS PRN
Qty: 12 TABLET | Refills: 0 | Status: SHIPPED | OUTPATIENT
Start: 2022-05-21

## 2022-05-21 RX ORDER — HYDROCODONE BITARTRATE AND ACETAMINOPHEN 5; 325 MG/1; MG/1
1 TABLET ORAL ONCE
Status: COMPLETED | OUTPATIENT
Start: 2022-05-21 | End: 2022-05-21

## 2022-05-21 RX ADMIN — SODIUM CHLORIDE 1000 ML: 9 INJECTION, SOLUTION INTRAVENOUS at 14:04

## 2022-05-21 RX ADMIN — SODIUM CHLORIDE 1000 ML: 9 INJECTION, SOLUTION INTRAVENOUS at 11:55

## 2022-05-21 RX ADMIN — IOPAMIDOL 100 ML: 612 INJECTION, SOLUTION INTRAVENOUS at 13:08

## 2022-05-21 RX ADMIN — PROCHLORPERAZINE EDISYLATE 5 MG: 5 INJECTION INTRAMUSCULAR; INTRAVENOUS at 11:55

## 2022-05-21 RX ADMIN — HYDROCODONE BITARTATE AND ACETAMINOPHEN 1 TABLET: 5; 325 TABLET ORAL at 14:04

## 2022-05-21 RX ADMIN — METRONIDAZOLE 500 MG: 500 TABLET ORAL at 14:05

## 2022-05-21 NOTE — ED PROVIDER NOTES
EMERGENCY DEPARTMENT ENCOUNTER    Pt Name: Willy Vu  MRN: [id number]  Pt :   1995  Room Number:    Date of encounter:  2022  PCP: Karla Hernandez APRN  ED Provider: Amadeo Garcia MD    Historian: Patient      HPI:  Chief Complaint: Abdominal pain        Context: Willy Vu is a 26 y.o. male who presents to the ED c/o vomiting, diarrhea, and abdominal pain.  The symptom with the acute issue today have been present for 2 days.  He does report a prior history of recurrent abdominal issues, and some vomiting and diarrhea problems.  He denies prior abdominal surgery or history.  His feel cold today but denies a fever.  No known sick contacts or exposures.      PAST MEDICAL HISTORY  No past medical history on file.      PAST SURGICAL HISTORY  No past surgical history on file.      FAMILY HISTORY  No family history on file.      SOCIAL HISTORY  Social History     Socioeconomic History   • Marital status: Single   Tobacco Use   • Smoking status: Never Smoker   • Smokeless tobacco: Never Used   Vaping Use   • Vaping Use: Never used   Substance and Sexual Activity   • Alcohol use: Yes   • Drug use: Yes     Types: Marijuana   • Sexual activity: Defer         ALLERGIES  Patient has no known allergies.        REVIEW OF SYSTEMS  Review of Systems       All systems reviewed and negative except for those discussed in HPI.       PHYSICAL EXAM    I have reviewed the triage vital signs and nursing notes.    ED Triage Vitals [22 1111]   Temp Heart Rate Resp BP SpO2   98.6 °F (37 °C) 73 18 134/98 100 %      Temp src Heart Rate Source Patient Position BP Location FiO2 (%)   Oral Monitor Sitting Left arm --       Physical Exam  GENERAL:   Appears anxious, moderate distress  HENT: Nares patent.  EYES: No scleral icterus.  CV: Regular rhythm, regular rate.  RESPIRATORY: Normal effort.  No audible wheezes, rales or rhonchi.  ABDOMEN: Soft, tender  MUSCULOSKELETAL: No deformities.    NEURO: Alert, moves all extremities, follows commands.  SKIN: Warm, dry, no rash visualized.        LAB RESULTS  Recent Results (from the past 24 hour(s))   Comprehensive Metabolic Panel    Collection Time: 05/21/22 11:51 AM    Specimen: Blood   Result Value Ref Range    Glucose 117 (H) 65 - 99 mg/dL    BUN 9 6 - 20 mg/dL    Creatinine 0.86 0.76 - 1.27 mg/dL    Sodium 140 136 - 145 mmol/L    Potassium 3.8 3.5 - 5.2 mmol/L    Chloride 106 98 - 107 mmol/L    CO2 22.0 22.0 - 29.0 mmol/L    Calcium 10.1 8.6 - 10.5 mg/dL    Total Protein 7.8 6.0 - 8.5 g/dL    Albumin 5.00 3.50 - 5.20 g/dL    ALT (SGPT) 9 1 - 41 U/L    AST (SGOT) 18 1 - 40 U/L    Alkaline Phosphatase 71 39 - 117 U/L    Total Bilirubin 0.5 0.0 - 1.2 mg/dL    Globulin 2.8 gm/dL    A/G Ratio 1.8 g/dL    BUN/Creatinine Ratio 10.5 7.0 - 25.0    Anion Gap 12.0 5.0 - 15.0 mmol/L    eGFR 124.0 >60.0 mL/min/1.73   Lipase    Collection Time: 05/21/22 11:51 AM    Specimen: Blood   Result Value Ref Range    Lipase 15 13 - 60 U/L   Green Top (Gel)    Collection Time: 05/21/22 11:51 AM   Result Value Ref Range    Extra Tube Hold for add-ons.    Lavender Top    Collection Time: 05/21/22 11:51 AM   Result Value Ref Range    Extra Tube hold for add-on    Gold Top - SST    Collection Time: 05/21/22 11:51 AM   Result Value Ref Range    Extra Tube Hold for add-ons.    Light Blue Top    Collection Time: 05/21/22 11:51 AM   Result Value Ref Range    Extra Tube Hold for add-ons.    CBC Auto Differential    Collection Time: 05/21/22 11:51 AM    Specimen: Blood   Result Value Ref Range    WBC 8.06 3.40 - 10.80 10*3/mm3    RBC 4.89 4.14 - 5.80 10*6/mm3    Hemoglobin 15.4 13.0 - 17.7 g/dL    Hematocrit 45.0 37.5 - 51.0 %    MCV 92.0 79.0 - 97.0 fL    MCH 31.5 26.6 - 33.0 pg    MCHC 34.2 31.5 - 35.7 g/dL    RDW 12.3 12.3 - 15.4 %    RDW-SD 41.7 37.0 - 54.0 fl    MPV 10.9 6.0 - 12.0 fL    Platelets 241 140 - 450 10*3/mm3    Neutrophil % 72.4 42.7 - 76.0 %    Lymphocyte % 8.9 (L)  19.6 - 45.3 %    Monocyte % 17.0 (H) 5.0 - 12.0 %    Eosinophil % 1.1 0.3 - 6.2 %    Basophil % 0.4 0.0 - 1.5 %    Immature Grans % 0.2 0.0 - 0.5 %    Neutrophils, Absolute 5.83 1.70 - 7.00 10*3/mm3    Lymphocytes, Absolute 0.72 0.70 - 3.10 10*3/mm3    Monocytes, Absolute 1.37 (H) 0.10 - 0.90 10*3/mm3    Eosinophils, Absolute 0.09 0.00 - 0.40 10*3/mm3    Basophils, Absolute 0.03 0.00 - 0.20 10*3/mm3    Immature Grans, Absolute 0.02 0.00 - 0.05 10*3/mm3    nRBC 0.0 0.0 - 0.2 /100 WBC   Urinalysis With Microscopic If Indicated (No Culture) - Urine, Clean Catch    Collection Time: 05/21/22  1:14 PM    Specimen: Urine, Clean Catch   Result Value Ref Range    Color, UA Yellow Yellow, Straw    Appearance, UA Clear Clear    pH, UA 5.5 5.0 - 8.0    Specific Gravity, UA 1.033 (H) 1.001 - 1.030    Glucose, UA Negative Negative    Ketones, UA Trace (A) Negative    Bilirubin, UA Negative Negative    Blood, UA Negative Negative    Protein, UA Negative Negative    Leuk Esterase, UA Negative Negative    Nitrite, UA Negative Negative    Urobilinogen, UA 0.2 E.U./dL 0.2 - 1.0 E.U./dL       If labs were ordered, I independently reviewed the results.        RADIOLOGY  CT Abdomen Pelvis With Contrast    Result Date: 5/21/2022  CT ABDOMEN PELVIS W CONTRAST-  Date of Exam: 5/21/2022 12:54 PM  Indication: diverticulitis/colitis.  Comparison: CT 01/19/2021, 09/20/2020  Technique: Contiguous axial CT images were obtained from the lung bases to the superior iliac crests with contrast.  Following uneventful administration of 100 cc of Isovue-300 intravenous and oral contrast, contiguous axial images obtained from lung bases to the pubic symphysis. Sagittal and coronal reconstructions were performed.  Automated exposure control and iterative reconstruction methods were used.  FINDINGS: Lower Thorax: No acute process identified.  Liver: Normal size and density. No focal lesions identified.  Gallbladder: The gallbladder appears normal in size  with no evidence of radiopaque gallstones. The biliary tree is nondilated.  Pancreas: No focal masses.  No pancreatic duct dilation.  Spleen: Spleen is normal size.  No focal splenic lesions.  Adrenal glands: Unremarkable.  Kidneys: The kidneys appear normal in size. No evidence of nephrolithiasis. No evidence of hydronephrosis. No suspicious focal lesions identified.  Retroperitoneum/vascular: No retroperitoneal adenopathy identified. No aneurysmal dilatation of the vascular structures.  Stomach and Bowel: Limited evaluation due to lack of significant intra-abdominal or intrapelvic fat. No evidence of obstruction. There is mild distention of the small bowel loops within the upper abdomen which appears hyperemic with mild wall thickening. No evidence of pneumatosis. No significant dilatation identified. The remaining bowel loops appear unremarkable. No significant stool burden identified.. No free fluid. No focal fluid collections identified. The appendix appears within normal limits. No mesenteric adenopathy identified. No evidence of free air.  Bladder: The bladder appears unremarkable.  Pelvic Organs: No acute process identified.  Osseous structures: No aggressive focal lytic or sclerotic osseous lesions. No acute osseous abnormality.       1. Findings likely related to a mild enteritis. 2. Ancillary findings as described above.  This report was finalized on 5/21/2022 1:24 PM by Mary Cruz MD.        I ordered and reviewed the above noted radiographic studies.      I viewed images of CT abdomen, with no findings of free air, or obstruction    See radiologist's dictation for official interpretation.        PROCEDURES    Procedures    No orders to display       MEDICATIONS GIVEN IN ER    Medications   Sodium Chloride (PF) 0.9 % 10 mL (has no administration in time range)   sodium chloride 0.9 % flush 10 mL (has no administration in time range)   sodium chloride 0.9 % bolus 1,000 mL (1,000 mL Intravenous New Bag  5/21/22 1155)   prochlorperazine (COMPAZINE) injection 5 mg (5 mg Intravenous Given 5/21/22 1155)     Or   prochlorperazine (COMPAZINE) suppository 25 mg ( Rectal Not Given:  See Alt 5/21/22 1155)   iopamidol (ISOVUE-300) 61 % injection 100 mL (100 mL Intravenous Given 5/21/22 1308)         PROGRESS, DATA ANALYSIS, CONSULTS, AND MEDICAL DECISION MAKING    After rehydration, he does feel better although he has some continued intermittent cramping pain.  He has eaten at [employer], few days before, is a suspected source of possible foodborne infection with colitis.  I discussed with him coverage for Campylobacter, and other bacterial enteritis, and also discussed clear liquids and a bland diet over time, and consider follow-up with his family doctor, or GI if symptoms continue.    Differential diagnoses: Enteritis, gastroenteritis, diverticulitis                 AS OF 13:48 EDT VITALS:    BP - 134/98  HR - 73  TEMP - 98.6 °F (37 °C) (Oral)  O2 SATS - 100%                  DIAGNOSIS  Final diagnoses:   Enterocolitis         DISPOSITION  DISCHARGE    Patient discharged in stable condition.    Reviewed implications of results, diagnosis, meds, responsibility to follow up, warning signs and symptoms of possible worsening, potential complications and reasons to return to ER.    Patient/Family voiced understanding of above instructions.    Discussed plan for discharge, as there is no emergent indication for admission.  Pt/family is agreeable and understands need for follow up and possible repeat testing.  Pt/family is aware that discharge does not mean that nothing is wrong but that it indicates no emergency is currently present that requires admission and they must continue care with follow-up as given below or with a physician of their choice.     FOLLOW-UP  Brunner, Mark I, MD  [address] [id number] [phone]    In 1 week  GI clinic if symptoms do not improve and if ongoing symptoms  after 1 week of treatment and probiotics., If symptoms worsen    [facility] Emergency Department  [address]  [facility] [address] [phone]    Return to the ED if any new or worsening symptoms of concern.         Medication List      New Prescriptions    ciprofloxacin 500 MG tablet  Commonly known as: CIPRO  Take 1 tablet by mouth 2 (Two) Times a Day for 3 days.     metroNIDAZOLE 500 MG tablet  Commonly known as: FLAGYL  Take 0.5 tablets by mouth 2 (Two) Times a Day for 5 days.        Changed    * promethazine 25 MG tablet  Commonly known as: PHENERGAN  Take 1 tablet by mouth Every 6 (Six) Hours As Needed for Nausea or Vomiting.  What changed: Another medication with the same name was added. Make sure you understand how and when to take each.     * promethazine 25 MG tablet  Commonly known as: PHENERGAN  Take 1 tablet by mouth Every 6 (Six) Hours As Needed for Nausea or Vomiting.  What changed: You were already taking a medication with the same name, and this prescription was added. Make sure you understand how and when to take each.         * This list has 2 medication(s) that are the same as other medications prescribed for you. Read the directions carefully, and ask your doctor or other care provider to review them with you.               Where to Get Your Medications      These medications were sent to [facility] - [address] - [phone]  - [phone] [address] ROOM [facility], [place] [address]    Phone: [phone]   · ciprofloxacin 500 MG tablet  · metroNIDAZOLE 500 MG tablet  · promethazine 25 MG tablet                  Amadeo Garcia MD  05/21/22 8772

## 2022-06-01 LAB — REF LAB TEST METHOD: NORMAL

## 2023-10-20 NOTE — ED PROVIDER NOTES
EMERGENCY DEPARTMENT ENCOUNTER      Pt Name: Willy Vu  MRN: [id number]  YOB: 1995  Date of evaluation: 1/20/2021  Provider: Percy Mancilla DO    CHIEF COMPLAINT       Chief Complaint   Patient presents with   • Abdominal Pain         HISTORY OF PRESENT ILLNESS  (Location/Symptom, Timing/Onset, Context/Setting, Quality, Duration, Modifying Factors, Severity.)   Willy Vu is a 25 y.o. male who presents to the emergency department for reevaluation of nausea, vomiting, generalized and lower abdominal pain which been present for the last 1.5 days.  He states nonbloody, nonbilious emesis, nonbloody stools/diarrhea for the last couple days.  He states he was here yesterday with similar symptoms, had IV, CT scan evaluated with no acute abnormalities, was sent home after his symptoms were under control.  Returns again as after he went home he started having recurrent abdominal pain, vomiting and diarrhea.  He did not eat any large meals after he was discharged, states he has not had any recent travel, has had a roommate who had some gastrointestinal issues couple weeks ago I was in the hospital for this.  He denies any significant medical history, no GI issues in general.  Has subjective fever, chills.  No chest pain cough or congestion.  No other acute systemic complaints at this time.      Nursing notes were reviewed.    REVIEW OF SYSTEMS    (2-9 systems for level 4, 10 or more for level 5)   ROS:  General: Positive generalized fever, chills  Cardiovascular:  No chest pain, no palpitations  Respiratory:  No shortness of breath, no cough, no wheezing  Gastrointestinal: Positive lower abdominal pain, vomiting, diarrhea  Musculoskeletal:  No muscle pain, no joint pain  Skin:  No rash  Neurologic:  No headache  Psychiatric:  No anxiety  Genitourinary:  No dysuria, no hematuria    Except as noted above the remainder of the review of systems was reviewed and negative.  "Nurse Triage SBAR    Is this a 2nd Level Triage? YES, LICENSED PRACTITIONER REVIEW IS REQUIRED    Situation: eye pain with swelling to below eye to cheek and discolored.    Pain 8/10    Background:   Both upper eyelid blepharoplasty, ptosis repair, right internal browpexy Bilateral MAC with Local   Both lower eyelid ectropion repair     Mine Munoz MD  on 10-    Assessment:   No fever or chils  No drainage,   No vision impairement  NO fall or injury  Patient with complaint of pain to eye and discoloration to below eye and cheek.  Using ice and now heat to face.    Protocol Recommended Disposition:   Routing to [facility] for a call back.    Continue with the ice, with a barrier on skin, like a washcloth    Routed to provider        Reason for Disposition   SEVERE post-op pain (e.g., excruciating, pain scale 8-10) that is not controlled with pain medications    Additional Information   Negative: Sounds like a life-threatening emergency to the triager   Negative: Bright red, wide-spread, sunburn-like rash   Negative: SEVERE headache and after spinal (epidural) anesthesia   Negative: Vomiting and persists > 4 hours   Negative: Vomiting and abdomen looks much more swollen than usual   Negative: Drinking very little and dehydration suspected (e.g., no urine > 12 hours, very dry mouth, very lightheaded)   Negative: Patient sounds very sick or weak to the triager   Negative: Sounds like a serious complication to the triager   Negative: Fever > 100.4 F (38.0 C)   Negative: Caller has URGENT question and triager unable to answer question    Answer Assessment - Initial Assessment Questions  1. SYMPTOM: \"What's the main symptom you're concerned about?\" (e.g., pain, fever, vomiting)      Facial edema from under the eye to her cheek, bruising color  2. ONSET: \"When did   start?\"      yesterday  3. SURGERY: \"What surgery did you have?\"      Both upper eyelid blepharoplasty, ptosis repair, right internal "       PAST MEDICAL HISTORY   No past medical history on file.      SURGICAL HISTORY     No past surgical history on file.      CURRENT MEDICATIONS       Current Facility-Administered Medications:   •  Morphine sulfate (PF) injection 4 mg, 4 mg, Intravenous, Once, Percy Mancilla DO  •  ondansetron (ZOFRAN) injection 4 mg, 4 mg, Intravenous, Q30 Min PRN, Percy Mancilla DO, 4 mg at 01/20/21 0348  •  promethazine (PHENERGAN) IVPB 12.5 mg, 12.5 mg, Intravenous, Q6H PRN, Percy Mancilla DO, Stopped at 01/20/21 0553  •  [COMPLETED] Insert peripheral IV, , , Once **AND** sodium chloride 0.9 % flush 10 mL, 10 mL, Intravenous, PRN, Percy Mancilla DO    Current Outpatient Medications:   •  dicyclomine (BENTYL) 20 MG tablet, Take 1 tablet by mouth Every 6 (Six) Hours., Disp: 24 tablet, Rfl: 0  •  naproxen (Naprosyn) 500 MG tablet, Take 1 tablet by mouth 2 (Two) Times a Day With Meals., Disp: 20 tablet, Rfl: 0  •  omeprazole (priLOSEC) 40 MG capsule, Take 1 capsule by mouth Daily., Disp: 30 capsule, Rfl: 0  •  ondansetron (ZOFRAN) 4 MG tablet, Take 1 tablet by mouth Every 6 (Six) Hours As Needed for Nausea or Vomiting., Disp: 15 tablet, Rfl: 0  •  promethazine (PHENERGAN) 25 MG tablet, Take 1 tablet by mouth Every 6 (Six) Hours As Needed for Nausea or Vomiting., Disp: 16 tablet, Rfl: 0    ALLERGIES     Patient has no known allergies.    FAMILY HISTORY     No family history on file.       SOCIAL HISTORY       Social History     Socioeconomic History   • Marital status: Single     Spouse name: Not on file   • Number of children: Not on file   • Years of education: Not on file   • Highest education level: Not on file   Tobacco Use   • Smoking status: Never Smoker   • Smokeless tobacco: Never Used   Substance and Sexual Activity   • Alcohol use: Yes   • Drug use: Yes     Types: Marijuana   • Sexual activity: Defer         PHYSICAL EXAM    (up to 7 for level 4, 8 or more for level 5)     Vitals:     "browpexy Bilateral MAC with Local  Both lower eyelid ectropion repair    Mine Munoz MD  on 10-      4. DATE of SURGERY: \"When was the surgery?\"       10-  5. ANESTHESIA: \" What type of anesthesia did you have?\" (e.g., general, spinal, epidural, local)        6. PAIN: \"Is there any pain?\" If Yes, ask: \"How bad is it?\"  (Scale 1-10; or mild, moderate, severe)      8/10 burning to eyes and cheeks  7. FEVER: \"Do you have a fever?\" If Yes, ask: \"What is your temperature, how was it measured, and when did it start?\"      no  8. VOMITING: \"Is there any vomiting?\" If Yes, ask: \"How many times?\"      no  9. BLEEDING: \"Is there any bleeding?\" If Yes, ask: \"How much?\" and \"Where?\"      no  10. OTHER SYMPTOMS: \"Do you have any other symptoms?\" (e.g., drainage from wound, painful urination, constipation)        No fever or chills, No fall or injury, No drainage.  No vision impairment.  Swelling under the eye to cheek and purple color.  Not able to send a photo for review.    No pain medication, Tylenol gives her heartburn., makes her sick.  She has been using ice and now heat to face.    Protocols used: Post-Op Symptoms and [id number]    " "01/20/21 0215 01/20/21 0500 01/20/21 0600   BP: 133/79 111/59 112/57   BP Location: Left arm     Patient Position: Sitting     Pulse: 70 67 65   Resp: 18 18 16   Temp: 98.9 °F (37.2 °C)     TempSrc: Temporal     SpO2: 99% 98% 97%   Weight: 55.8 kg (123 lb)     Height: 170.2 cm (67\")         Physical Exam  General : Patient is awake, alert, oriented, mildly uncomfortable  HEENT: Pupils are equally round and reactive to light, EOMI, conjunctivae clear  Neck: Neck is supple, full range of motion, trachea midline  Cardiac: Heart regular rate, rhythm  Lungs: Lungs are clear to auscultation, no respiratory distress  Abdomen: Abdomen is soft, nondistended.  There is tenderness throughout the lower abdomen with subjective guarding, no peritoneal signs, bowel sounds are present diffusely throughout  Musculoskeletal: 5 out of 5 strength in all 4 extremities.  No focal muscle deficits are appreciated  Neuro: Motor intact, sensory intact, level of consciousness is normal, GCS 15  Dermatology: Skin is warm and dry  Psych: Mentation is grossly normal, cognition is grossly normal. Affect is appropriate.      DIAGNOSTIC RESULTS     EKG: All EKG's are interpreted by the Emergency Department Physician who either signs or Co-signs this chart in the absence of a cardiologist.    No orders to display       RADIOLOGY:   Non-plain film images such as CT, Ultrasound and MRI are read by the radiologist. Plain radiographic images are visualized and preliminarily interpreted by the emergency physician with the below findings:      [] Radiologist's Report Reviewed:  No orders to display         ED BEDSIDE ULTRASOUND:   Performed by ED Physician - none    LABS:    I have reviewed and interpreted all of the currently available lab results from this visit (if applicable):  Results for orders placed or performed during the hospital encounter of 01/20/21   Comprehensive Metabolic Panel    Specimen: Blood   Result Value Ref Range    Glucose 134 (H) " 65 - 99 mg/dL    BUN 11 6 - 20 mg/dL    Creatinine 0.74 (L) 0.76 - 1.27 mg/dL    Sodium 140 136 - 145 mmol/L    Potassium 3.5 3.5 - 5.2 mmol/L    Chloride 105 98 - 107 mmol/L    CO2 23.0 22.0 - 29.0 mmol/L    Calcium 9.4 8.6 - 10.5 mg/dL    Total Protein 7.2 6.0 - 8.5 g/dL    Albumin 4.40 3.50 - 5.20 g/dL    ALT (SGPT) 12 1 - 41 U/L    AST (SGOT) 18 1 - 40 U/L    Alkaline Phosphatase 47 39 - 117 U/L    Total Bilirubin 0.7 0.0 - 1.2 mg/dL    eGFR  African Amer >150 >60 mL/min/1.73    Globulin 2.8 gm/dL    A/G Ratio 1.6 g/dL    BUN/Creatinine Ratio 14.9 7.0 - 25.0    Anion Gap 12.0 5.0 - 15.0 mmol/L   CBC Auto Differential    Specimen: Blood   Result Value Ref Range    WBC 8.02 3.40 - 10.80 10*3/mm3    RBC 4.23 4.14 - 5.80 10*6/mm3    Hemoglobin 13.0 13.0 - 17.7 g/dL    Hematocrit 39.2 37.5 - 51.0 %    MCV 92.7 79.0 - 97.0 fL    MCH 30.7 26.6 - 33.0 pg    MCHC 33.2 31.5 - 35.7 g/dL    RDW 12.5 12.3 - 15.4 %    RDW-SD 42.8 37.0 - 54.0 fl    MPV 11.2 6.0 - 12.0 fL    Platelets 190 140 - 450 10*3/mm3    Neutrophil % 86.5 (H) 42.7 - 76.0 %    Lymphocyte % 3.7 (L) 19.6 - 45.3 %    Monocyte % 7.9 5.0 - 12.0 %    Eosinophil % 1.4 0.3 - 6.2 %    Basophil % 0.1 0.0 - 1.5 %    Immature Grans % 0.4 0.0 - 0.5 %    Neutrophils, Absolute 6.94 1.70 - 7.00 10*3/mm3    Lymphocytes, Absolute 0.30 (L) 0.70 - 3.10 10*3/mm3    Monocytes, Absolute 0.63 0.10 - 0.90 10*3/mm3    Eosinophils, Absolute 0.11 0.00 - 0.40 10*3/mm3    Basophils, Absolute 0.01 0.00 - 0.20 10*3/mm3    Immature Grans, Absolute 0.03 0.00 - 0.05 10*3/mm3    nRBC 0.0 0.0 - 0.2 /100 WBC   Scan Slide    Specimen: Blood   Result Value Ref Range    RBC Morphology Normal Normal    WBC Morphology Normal Normal    Platelet Morphology Normal Normal        All other labs were within normal range or not returned as of this dictation.      EMERGENCY DEPARTMENT COURSE and DIFFERENTIAL DIAGNOSIS/MDM:   Vitals:    Vitals:    01/20/21 0215 01/20/21 0500 01/20/21 0600   BP: 133/79  "111/59 112/57   BP Location: Left arm     Patient Position: Sitting     Pulse: 70 67 65   Resp: 18 18 16   Temp: 98.9 °F (37.2 °C)     TempSrc: Temporal     SpO2: 99% 98% 97%   Weight: 55.8 kg (123 lb)     Height: 170.2 cm (67\")              Patient with generalized lower abdominal pain, nausea, vomiting and diarrhea for the last 1.5 days.  He was seen in our facility with the symptoms earlier today had IV, labs, CT of the abdomen/pelvis with no acute abnormalities.  Appears to have a underlying gastrointestinal issue which will require continued symptomatic therapies.  As I have any peritoneal signs on reexamination this evening.  His vital signs are stable, he is afebrile, no meningeal signs examination.  We discussed obtaining IV, given the patient symptomatic therapies.  Has had a sick contact with gastrointestinal disease of the last couple weeks.  We will also obtain a stool sample GI pathogen PCR.  Electrolytes, blood work is unremarkable, GI PCR still pending.  I have to the patient his results, he is resting comfortably, likely has underlying GI process, likely viral in nature, will await the PCR results and call the patient with any updates as needed.  Patient will continue with good fluid hydration, rest over the next few days, follow-up with his PCP for reevaluation.  Any worsening symptoms or further concerns to return to the emergency department.  Patient will continue with his previously prescribed medications including Phenergan from his earlier visit. I had a discussion with the patient/family regarding diagnosis, diagnostic results, treatment plan, and medications.  The patient/family indicated understanding of these instructions.  I spent adequate time at the bedside preceding discharge necessary to personally discuss the aftercare instructions, giving patient education, providing explanations of the results of our evaluations/findings, and my decision making to assure that the patient/family " understand the plan of care.  Time was allotted to answer questions at that time and throughout the ED course.  Emphasis was placed on timely follow-up after discharge.  I also discussed the potential for the development of an acute emergent condition requiring further evaluation, admission, or even surgical intervention. I discussed that we found nothing during the visit today indicating the need for further workup, admission, or the presence of an unstable medical condition.  I encouraged the patient to return to the emergency department immediately for ANY concerns, worsening, new complaints, or if symptoms persist and unable to seek follow-up in a timely fashion.  The patient/family expressed understanding and agreement with this plan.  The patient will follow-up with their PCP in 1-2 days for reevaluation.       MEDICATIONS ADMINISTERED IN ED:  Medications   sodium chloride 0.9 % flush 10 mL (has no administration in time range)   ondansetron (ZOFRAN) injection 4 mg (4 mg Intravenous Given 1/20/21 0348)   Morphine sulfate (PF) injection 4 mg ( Intravenous Return to Cabinet 1/20/21 0450)   promethazine (PHENERGAN) IVPB 12.5 mg (0 mg Intravenous Stopped 1/20/21 0553)   sodium chloride 0.9 % bolus 1,000 mL (0 mL Intravenous Stopped 1/20/21 0451)   sodium chloride 0.9 % bolus 1,000 mL (1,000 mL Intravenous New Bag 1/20/21 0556)       PROCEDURES:  Procedures    CRITICAL CARE TIME    Total Critical Care time was 0 minutes, excluding separately reportable procedures.   There was a high probability of clinically significant/life threatening deterioration in the patient's condition which required my urgent intervention.      FINAL IMPRESSION      1. Nausea vomiting and diarrhea    2. Lower abdominal pain          DISPOSITION/PLAN     ED Disposition     ED Disposition Condition Comment    Discharge Stable           PATIENT REFERRED TO:  Karla Hernandez, APRN  [address] [facility]  [address]  [phone]    In 2 days      [facility] Emergency Department  [address] [facility] [address] [phone]    If symptoms worsen      DISCHARGE MEDICATIONS:     Medication List      CONTINUE taking these medications    dicyclomine 20 MG tablet  Commonly known as: BENTYL  Take 1 tablet by mouth Every 6 (Six) Hours.     naproxen 500 MG tablet  Commonly known as: Naprosyn  Take 1 tablet by mouth 2 (Two) Times a Day With Meals.     omeprazole 40 MG capsule  Commonly known as: priLOSEC  Take 1 capsule by mouth Daily.     ondansetron 4 MG tablet  Commonly known as: ZOFRAN  Take 1 tablet by mouth Every 6 (Six) Hours As Needed for Nausea or Vomiting.     promethazine 25 MG tablet  Commonly known as: PHENERGAN  Take 1 tablet by mouth Every 6 (Six) Hours As Needed for Nausea or Vomiting.                Comment: Please note this report has been produced using speech recognition software.      Percy Mancilla DO  Attending Emergency Physician               Percy Mancilla DO  01/20/21 6253

## 2024-01-02 ENCOUNTER — OFFICE VISIT (OUTPATIENT)
Dept: FAMILY MEDICINE CLINIC | Facility: CLINIC | Age: 29
End: 2024-01-02
Payer: MEDICAID

## 2024-01-02 ENCOUNTER — LAB (OUTPATIENT)
Dept: LAB | Facility: HOSPITAL | Age: 29
End: 2024-01-02
Payer: MEDICAID

## 2024-01-02 VITALS
TEMPERATURE: 98.6 F | HEIGHT: 68 IN | WEIGHT: 126.6 LBS | SYSTOLIC BLOOD PRESSURE: 118 MMHG | DIASTOLIC BLOOD PRESSURE: 78 MMHG | HEART RATE: 71 BPM | BODY MASS INDEX: 19.19 KG/M2

## 2024-01-02 DIAGNOSIS — K58.2 IRRITABLE BOWEL SYNDROME WITH BOTH CONSTIPATION AND DIARRHEA: ICD-10-CM

## 2024-01-02 DIAGNOSIS — F41.1 GAD (GENERALIZED ANXIETY DISORDER): ICD-10-CM

## 2024-01-02 DIAGNOSIS — Z11.3 ROUTINE SCREENING FOR STI (SEXUALLY TRANSMITTED INFECTION): ICD-10-CM

## 2024-01-02 DIAGNOSIS — Z00.00 ENCOUNTER FOR ANNUAL PHYSICAL EXAM: Primary | ICD-10-CM

## 2024-01-02 DIAGNOSIS — Z00.00 ENCOUNTER FOR ANNUAL PHYSICAL EXAM: ICD-10-CM

## 2024-01-02 LAB
ALBUMIN SERPL-MCNC: 5 G/DL (ref 3.5–5.2)
ALBUMIN/GLOB SERPL: 1.9 G/DL
ALP SERPL-CCNC: 58 U/L (ref 39–117)
ALT SERPL W P-5'-P-CCNC: 15 U/L (ref 1–41)
ANION GAP SERPL CALCULATED.3IONS-SCNC: 11 MMOL/L (ref 5–15)
AST SERPL-CCNC: 22 U/L (ref 1–40)
BASOPHILS # BLD AUTO: 0.03 10*3/MM3 (ref 0–0.2)
BASOPHILS NFR BLD AUTO: 0.5 % (ref 0–1.5)
BILIRUB SERPL-MCNC: 0.3 MG/DL (ref 0–1.2)
BUN SERPL-MCNC: 11 MG/DL (ref 6–20)
BUN/CREAT SERPL: 12.8 (ref 7–25)
CALCIUM SPEC-SCNC: 9.9 MG/DL (ref 8.6–10.5)
CHLORIDE SERPL-SCNC: 105 MMOL/L (ref 98–107)
CO2 SERPL-SCNC: 24 MMOL/L (ref 22–29)
CREAT SERPL-MCNC: 0.86 MG/DL (ref 0.76–1.27)
DEPRECATED RDW RBC AUTO: 40.9 FL (ref 37–54)
EGFRCR SERPLBLD CKD-EPI 2021: 122.5 ML/MIN/1.73
EOSINOPHIL # BLD AUTO: 0.06 10*3/MM3 (ref 0–0.4)
EOSINOPHIL NFR BLD AUTO: 1.1 % (ref 0.3–6.2)
ERYTHROCYTE [DISTWIDTH] IN BLOOD BY AUTOMATED COUNT: 12.1 % (ref 12.3–15.4)
GLOBULIN UR ELPH-MCNC: 2.7 GM/DL
GLUCOSE SERPL-MCNC: 91 MG/DL (ref 65–99)
HCT VFR BLD AUTO: 41.8 % (ref 37.5–51)
HGB BLD-MCNC: 13.6 G/DL (ref 13–17.7)
HIV 1+2 AB+HIV1 P24 AG SERPL QL IA: NORMAL
IMM GRANULOCYTES # BLD AUTO: 0.07 10*3/MM3 (ref 0–0.05)
IMM GRANULOCYTES NFR BLD AUTO: 1.3 % (ref 0–0.5)
LYMPHOCYTES # BLD AUTO: 1.63 10*3/MM3 (ref 0.7–3.1)
LYMPHOCYTES NFR BLD AUTO: 29.7 % (ref 19.6–45.3)
MCH RBC QN AUTO: 30 PG (ref 26.6–33)
MCHC RBC AUTO-ENTMCNC: 32.5 G/DL (ref 31.5–35.7)
MCV RBC AUTO: 92.3 FL (ref 79–97)
MONOCYTES # BLD AUTO: 0.6 10*3/MM3 (ref 0.1–0.9)
MONOCYTES NFR BLD AUTO: 10.9 % (ref 5–12)
NEUTROPHILS NFR BLD AUTO: 3.1 10*3/MM3 (ref 1.7–7)
NEUTROPHILS NFR BLD AUTO: 56.5 % (ref 42.7–76)
NRBC BLD AUTO-RTO: 0 /100 WBC (ref 0–0.2)
PLATELET # BLD AUTO: 309 10*3/MM3 (ref 140–450)
PMV BLD AUTO: 12 FL (ref 6–12)
POTASSIUM SERPL-SCNC: 4.3 MMOL/L (ref 3.5–5.2)
PROT SERPL-MCNC: 7.7 G/DL (ref 6–8.5)
RBC # BLD AUTO: 4.53 10*6/MM3 (ref 4.14–5.8)
RPR SER QL: NORMAL
SODIUM SERPL-SCNC: 140 MMOL/L (ref 136–145)
TSH SERPL DL<=0.05 MIU/L-ACNC: 1.78 UIU/ML (ref 0.27–4.2)
WBC NRBC COR # BLD AUTO: 5.49 10*3/MM3 (ref 3.4–10.8)

## 2024-01-02 PROCEDURE — 87591 N.GONORRHOEAE DNA AMP PROB: CPT

## 2024-01-02 PROCEDURE — 86695 HERPES SIMPLEX TYPE 1 TEST: CPT

## 2024-01-02 PROCEDURE — 84443 ASSAY THYROID STIM HORMONE: CPT

## 2024-01-02 PROCEDURE — 86696 HERPES SIMPLEX TYPE 2 TEST: CPT

## 2024-01-02 PROCEDURE — 86592 SYPHILIS TEST NON-TREP QUAL: CPT

## 2024-01-02 PROCEDURE — G0432 EIA HIV-1/HIV-2 SCREEN: HCPCS

## 2024-01-02 PROCEDURE — 87491 CHLMYD TRACH DNA AMP PROBE: CPT

## 2024-01-02 PROCEDURE — 80053 COMPREHEN METABOLIC PANEL: CPT

## 2024-01-02 PROCEDURE — 36415 COLL VENOUS BLD VENIPUNCTURE: CPT

## 2024-01-02 PROCEDURE — 85025 COMPLETE CBC W/AUTO DIFF WBC: CPT

## 2024-01-02 NOTE — ASSESSMENT & PLAN NOTE
Annual wellness exam completed today. Health Maintenance including immunizations was updated and reflected in the chart. Yearly screening labs were ordered.     Further recommendations to be given once lab data received.     Health advice: healthy food choices with fresh fruits and vegetables, maintain sleep pattern at least 8 hours, avoid texting and distracted driving practices; wear safety belt, engage in regular exercise, maintain healthy weight, use safe sex practices, avoid alcohol and illicit drugs. Maintain immunizations that are up to date. Maintain health maintenance:.  Follow up with PCP if struggling with depression or anxiety. Keep regular dental and eye exams. Brush and floss teeth daily.     F/U annually and prn.

## 2024-01-02 NOTE — ASSESSMENT & PLAN NOTE
Psychological condition is worsening.  Referral to psychological counseling.  Psychological condition  will be reassessed at the next regular appointment.  I do feel the patient would benefit from medication therapy with SSRI etc.  Patient would like to try counseling first.

## 2024-01-02 NOTE — PROGRESS NOTES
Subjective     Chief Complaint  Immunizations (Est care/Pt states he just got over the flu and wants to know if he gets the vaccine will he get sick again)    Subjective          Willy Vu is a 28 y.o. male who presents today to [facility] for initial evaluation .    HPI:   History of Present Illness    This patient is a 28-year-old male who presents today to [facility] care with a primary care physician and annual physical exam.    He has a history of IBS, he cannot eat spicy foods at all. He reports that he goes between constipation and diarrhea.     He reports that he has pretty bad baseline anxiety. This has worsened lately. He has some life changes that has made this worse.  This is starting to affect his day-to-day life.  He has been able to follow through with social events etc.  He is very is hesitant to start medication      The following portions of the patient's history were reviewed and updated as appropriate: allergies, current medications, past family history, past medical history, past social history, past surgical history and problem list.    Objective     Objective     Allergy:   No Known Allergies     Current Medications:   No current outpatient medications on file.     No current facility-administered medications for this visit.       Past Medical History:  Past Medical History:   Diagnosis Date    Anxiety     Chlamydia        Past Surgical History:  History reviewed. No pertinent surgical history.    Social History:  Social History     Socioeconomic History    Marital status: Single   Tobacco Use    Smoking status: Never     Passive exposure: Never    Smokeless tobacco: Never   Vaping Use    Vaping Use: Some days    Substances: Nicotine    Devices: Disposable   Substance and Sexual Activity    Alcohol use: Yes     Alcohol/week: 5.0 standard drinks of alcohol     Types: 3 Cans of beer, 2 Shots of liquor per week    Drug use: Yes     Types:  "Marijuana    Sexual activity: Defer       Family History:  Family History   Problem Relation Age of Onset    Hypertension Mother     Lupus Mother     No Known Problems Father     Hypertension Sister     Asthma Brother     Allergies Brother     Eczema Brother        Vital Signs:   /78   Pulse 71   Temp 98.6 °F (37 °C) (Infrared)   Ht 171.5 cm (67.52\")   Wt 57.4 kg (126 lb 9.6 oz)   BMI 19.52 kg/m²      Physical Exam:  Physical Exam  Constitutional:       Appearance: He is not ill-appearing.   Eyes:      Pupils: Pupils are equal, round, and reactive to light.   Cardiovascular:      Rate and Rhythm: Normal rate.      Pulses: Normal pulses.   Pulmonary:      Effort: Pulmonary effort is normal.      Breath sounds: Normal breath sounds.   Neurological:      General: No focal deficit present.      Mental Status: He is alert. Mental status is at baseline.   Psychiatric:         Mood and Affect: Mood normal.         Thought Content: Thought content normal.               PHQ-9 Score  PHQ-9 Total Score:       Lab Review  No visits with results within 3 Month(s) from this visit.   Latest known visit with results is:   Office Visit on 06/19/2023   Component Date Value Ref Range Status    Total Cholesterol 06/19/2023 147  0 - 200 mg/dL Final    Comment: Cholesterol Reference Ranges  (U.S. Department of Health and Human Services ATP III  Classifications)  Desirable          <200 mg/dL  Borderline High    200-239 mg/dL  High Risk          >240 mg/dL  Triglyceride Reference Ranges  (U.S. Department of Health and Human Services ATP III  Classifications)  Normal           <150 mg/dL  Borderline High  150-199 mg/dL  High             200-499 mg/dL  Very High        >500 mg/dL  HDL Reference Ranges  (U.S. Department of Health and Human Services ATP III  Classifications)  Low     <40 mg/dl (major risk factor for CHD)  High    >60 mg/dl ('negative' risk factor for CHD)  LDL Reference Ranges  (U.S. Department of Health and Human " Services ATP III  Classifications)  Optimal          <100 mg/dL  Near Optimal     100-129 mg/dL  Borderline High  130-159 mg/dL  High             160-189 mg/dL  Very High        >189 mg/dL      Triglycerides 06/19/2023 53  0 - 150 mg/dL Final    HDL Cholesterol 06/19/2023 85 (H)  40 - 60 mg/dL Final    VLDL Cholesterol Hans 06/19/2023 11  5 - 40 mg/dL Final    LDL Chol Calc (NIH) 06/19/2023 51  0 - 100 mg/dL Final    Anti-DNA (DS) Ab Qn 06/19/2023 1  0 - 9 IU/mL Final    Comment:                                    Negative      <5                                     Equivocal  5 - 9                                     Positive      >9      RNP Antibodies 06/19/2023 <0.2  0.0 - 0.9 AI Final    Yepez Antibodies 06/19/2023 <0.2  0.0 - 0.9 AI Final    Yepez/RNP Antibodies 06/19/2023 <0.2  0.0 - 0.9 AI Final    Antiscleroderma-70 Antibodies 06/19/2023 <0.2  0.0 - 0.9 AI Final    Sjogren's Anti-SS-A 06/19/2023 <0.2  0.0 - 0.9 AI Final    Sjogren's Anti-SS-B 06/19/2023 <0.2  0.0 - 0.9 AI Final    Antichromatin Antibodies 06/19/2023 <0.2  0.0 - 0.9 AI Final    Antiribosomal P Antibodies 06/19/2023 <0.2  0.0 - 0.9 AI Final    MERY-1 IgG 06/19/2023 <0.2  0.0 - 0.9 AI Final    Anti-Centromere B Antibodies 06/19/2023 0.2  0.0 - 0.9 AI Final    See below: 06/19/2023 Comment   Final    Comment: Autoantibody                       Disease Association  ____________________________________________________________                          Condition                  Frequency  _____________________   ________________________   _________  Antinuclear Antibody,    SLE, mixed connective  Direct (JANET-D)           tissue diseases  _____________________   ________________________   _________  dsDNA                    SLE                        40 - 60%  _____________________   ________________________   _________  Chromatin                Drug induced SLE                90%                           SLE                        48 -  97%  _____________________   ________________________   _________  SSA (Ro)                 SLE                        25 - 35%                           Sjogren's Syndrome         40 - 70%                            Lupus                 100%  _____________________   ________________________   _________  SSB (La)                 SLE                                                        10%                           Sjogren's Syndrome              30%  _____________________   _______________________    _________  Sm (anti-Smith)          SLE                        15 - 30%  _____________________   _______________________    _________  RNP                      Mixed Connective Tissue                           Disease                         95%  (U1 nRNP,                SLE                        30 - 50%  anti-ribonucleoprotein)  Polymyositis and/or                           Dermatomyositis                 20%  _____________________   ________________________   _________  Scl-70 (antiDNA          Scleroderma (diffuse)      20 - 35%  topoisomerase)           Crest                           13%  _____________________   ________________________   _________  Nila-1                     Polymyositis and/or                           Dermatomyositis            20 - 40%  _____________________   ________________________   _________  Centromere B             Scleroderma -                            Crest                           variant                         80%  _____________________   ________________________   _________  Ribosomal P              SLE                        10 - 20%      TSH 2023 2.500  0.270 - 4.200 uIU/mL Final    Glucose 2023 97  65 - 99 mg/dL Final    BUN 2023 10  6 - 20 mg/dL Final    Creatinine 2023 0.86  0.76 - 1.27 mg/dL Final    EGFR Result 2023 123.2  >60.0 mL/min/1.73 Final    Comment: GFR Normal >60  Chronic Kidney Disease <60  Kidney Failure <15       BUN/Creatinine Ratio 06/19/2023 11.6  7.0 - 25.0 Final    Sodium 06/19/2023 140  136 - 145 mmol/L Final    Potassium 06/19/2023 4.2  3.5 - 5.2 mmol/L Final    Chloride 06/19/2023 104  98 - 107 mmol/L Final    Total CO2 06/19/2023 27.0  22.0 - 29.0 mmol/L Final    Calcium 06/19/2023 9.6  8.6 - 10.5 mg/dL Final    Total Protein 06/19/2023 7.2  6.0 - 8.5 g/dL Final    Albumin 06/19/2023 4.9  3.5 - 5.2 g/dL Final    Globulin 06/19/2023 2.3  gm/dL Final    A/G Ratio 06/19/2023 2.1  g/dL Final    Total Bilirubin 06/19/2023 0.5  0.0 - 1.2 mg/dL Final    Alkaline Phosphatase 06/19/2023 54  39 - 117 U/L Final    AST (SGOT) 06/19/2023 19  1 - 40 U/L Final    ALT (SGPT) 06/19/2023 11  1 - 41 U/L Final    WBC 06/19/2023 5.74  3.40 - 10.80 10*3/mm3 Final    RBC 06/19/2023 4.15  4.14 - 5.80 10*6/mm3 Final    Hemoglobin 06/19/2023 13.4  13.0 - 17.7 g/dL Final    Hematocrit 06/19/2023 40.6  37.5 - 51.0 % Final    MCV 06/19/2023 97.8 (H)  79.0 - 97.0 fL Final    MCH 06/19/2023 32.3  26.6 - 33.0 pg Final    MCHC 06/19/2023 33.0  31.5 - 35.7 g/dL Final    RDW 06/19/2023 11.3 (L)  12.3 - 15.4 % Final    Platelets 06/19/2023 217  140 - 450 10*3/mm3 Final    Neutrophil Rel % 06/19/2023 61.0  42.7 - 76.0 % Final    Lymphocyte Rel % 06/19/2023 22.6  19.6 - 45.3 % Final    Monocyte Rel % 06/19/2023 9.8  5.0 - 12.0 % Final    Eosinophil Rel % 06/19/2023 5.6  0.3 - 6.2 % Final    Basophil Rel % 06/19/2023 0.7  0.0 - 1.5 % Final    Neutrophils Absolute 06/19/2023 3.50  1.70 - 7.00 10*3/mm3 Final    Lymphocytes Absolute 06/19/2023 1.30  0.70 - 3.10 10*3/mm3 Final    Monocytes Absolute 06/19/2023 0.56  0.10 - 0.90 10*3/mm3 Final    Eosinophils Absolute 06/19/2023 0.32  0.00 - 0.40 10*3/mm3 Final    Basophils Absolute 06/19/2023 0.04  0.00 - 0.20 10*3/mm3 Final    Immature Granulocyte Rel % 06/19/2023 0.3  0.0 - 0.5 % Final    Immature Grans Absolute 06/19/2023 0.02  0.00 - 0.05 10*3/mm3 Final    nRBC 06/19/2023 0.0  0.0 - 0.2 /100 WBC Final         Radiology Results        Assessment / Plan         Assessment and Plan   Diagnoses and all orders for this visit:    1. Encounter for annual physical exam (Primary)  Assessment & Plan:  Annual wellness exam completed today. Health Maintenance including immunizations was updated and reflected in the chart. Yearly screening labs were ordered.     Further recommendations to be given once lab data received.     Health advice: healthy food choices with fresh fruits and vegetables, maintain sleep pattern at least 8 hours, avoid texting and distracted driving practices; wear safety belt, engage in regular exercise, maintain healthy weight, use safe sex practices, avoid alcohol and illicit drugs. Maintain immunizations that are up to date. Maintain health maintenance:.  Follow up with PCP if struggling with depression or anxiety. Keep regular dental and eye exams. Brush and floss teeth daily.     F/U annually and prn.       Orders:  -     CBC & Differential; Future  -     Comprehensive Metabolic Panel; Future  -     TSH Rfx On Abnormal To Free T4; Future    2. Irritable bowel syndrome with both constipation and diarrhea  Assessment & Plan:  Referring patient to gastroenterology for a colonoscopy.    Orders:  -     Ambulatory Referral to Gastroenterology    3. CURTIS (generalized anxiety disorder)  Assessment & Plan:  Psychological condition is worsening.  Referral to psychological counseling.  Psychological condition  will be reassessed at the next regular appointment.  I do feel the patient would benefit from medication therapy with SSRI etc.  Patient would like to try counseling first.    Orders:  -     Ambulatory Referral to Behavioral Health    4. Routine screening for STI (sexually transmitted infection)  -     HIV-1/O/2 ANTIGEN/ANTIBODY, 4TH GENERATION; Future  -     HSV 1 and 2-Specific Ab, IgG; Future  -     RPR; Future  -     Chlamydia trachomatis, Neisseria gonorrhoeae, PCR - , Urine, Clean Catch;  Future        Discussed possible differential diagnoses, testing, treatment, recommended non-pharmacological interventions, risks, warning signs to monitor for that would indicate need for follow-up in clinic or ER. If no improvement with these regimens or you have new or worsening symptoms follow-up. Patient verbalizes understanding and agreement with plan of care. Denies further needs or concerns.     Patient was given instructions and counseling regarding her condition and for health maintenance advised.    BMI is within normal parameters. No other follow-up for BMI required.             Health Maintenance  Health Maintenance:   Health Maintenance Due   Topic Date Due    TDAP/TD VACCINES (1 - Tdap) Never done    INFLUENZA VACCINE  Never done        Meds ordered during this visit  No orders of the defined types were placed in this encounter.      Meds stopped during this visit:  There are no discontinued medications.     Visit Diagnoses    ICD-10-CM ICD-9-CM   1. Encounter for annual physical exam  Z00.00 V70.0   2. Irritable bowel syndrome with both constipation and diarrhea  K58.2 564.1   3. CURTIS (generalized anxiety disorder)  F41.1 300.02   4. Routine screening for STI (sexually transmitted infection)  Z11.3 V74.5       Patient was given instructions and counseling regarding his condition or for health maintenance advice. Please see specific information pulled into the AVS if appropriate.     Follow Up   Return in about 6 weeks (around 2/13/2024) for Recheck.          This document has been electronically signed by Carine Talbot DO   January 2, 2024 12:39 EST    Dictated Utilizing Dragon Dictation: Part of this note may be an electronic transcription/translation of spoken language to printed text using the Dragon Dictation System.    Carine Talbot D.O.  [facility]

## 2024-01-03 LAB
HSV1 IGG SER IA-ACNC: 26.2 INDEX (ref 0–0.9)
HSV2 IGG SER IA-ACNC: <0.91 INDEX (ref 0–0.9)

## 2024-01-04 LAB
C TRACH RRNA SPEC QL NAA+PROBE: POSITIVE
N GONORRHOEA RRNA SPEC QL NAA+PROBE: NEGATIVE

## 2024-01-05 RX ORDER — DOXYCYCLINE 100 MG/1
100 TABLET ORAL 2 TIMES DAILY
Qty: 14 TABLET | Refills: 0 | Status: SHIPPED | OUTPATIENT
Start: 2024-01-05 | End: 2024-01-12

## 2024-02-19 ENCOUNTER — OFFICE VISIT (OUTPATIENT)
Dept: FAMILY MEDICINE CLINIC | Facility: CLINIC | Age: 29
End: 2024-02-19
Payer: MEDICAID

## 2024-02-19 VITALS
HEIGHT: 68 IN | WEIGHT: 126.4 LBS | HEART RATE: 86 BPM | TEMPERATURE: 99.1 F | SYSTOLIC BLOOD PRESSURE: 128 MMHG | BODY MASS INDEX: 19.16 KG/M2 | DIASTOLIC BLOOD PRESSURE: 88 MMHG

## 2024-02-19 DIAGNOSIS — Z20.2 EXPOSURE TO GONORRHEA: ICD-10-CM

## 2024-02-19 DIAGNOSIS — A74.9 CHLAMYDIA INFECTION: Primary | ICD-10-CM

## 2024-02-19 DIAGNOSIS — B00.9 HSV-1 (HERPES SIMPLEX VIRUS 1) INFECTION: ICD-10-CM

## 2024-02-19 PROCEDURE — 1159F MED LIST DOCD IN RCRD: CPT | Performed by: FAMILY MEDICINE

## 2024-02-19 PROCEDURE — 96372 THER/PROPH/DIAG INJ SC/IM: CPT | Performed by: FAMILY MEDICINE

## 2024-02-19 PROCEDURE — 1160F RVW MEDS BY RX/DR IN RCRD: CPT | Performed by: FAMILY MEDICINE

## 2024-02-19 PROCEDURE — 99213 OFFICE O/P EST LOW 20 MIN: CPT | Performed by: FAMILY MEDICINE

## 2024-02-19 RX ORDER — DOXYCYCLINE 100 MG/1
100 TABLET ORAL 2 TIMES DAILY
Qty: 14 TABLET | Refills: 0 | Status: SHIPPED | OUTPATIENT
Start: 2024-02-19 | End: 2024-02-26

## 2024-02-19 RX ORDER — VALACYCLOVIR HYDROCHLORIDE 1 G/1
TABLET, FILM COATED ORAL
Qty: 30 TABLET | Refills: 5 | Status: SHIPPED | OUTPATIENT
Start: 2024-02-19 | End: 2024-02-20 | Stop reason: SDUPTHER

## 2024-02-19 RX ORDER — CEFTRIAXONE 1 G/1
1 INJECTION, POWDER, FOR SOLUTION INTRAMUSCULAR; INTRAVENOUS ONCE
Status: COMPLETED | OUTPATIENT
Start: 2024-02-19 | End: 2024-02-19

## 2024-02-19 RX ADMIN — CEFTRIAXONE 1 G: 1 INJECTION, POWDER, FOR SOLUTION INTRAMUSCULAR; INTRAVENOUS at 15:53

## 2024-02-19 NOTE — PROGRESS NOTES
Subjective     Chief Complaint  Exposure to STD (Go over lab results)    Subjective          Dontrellichael Martín Vu is a 28 y.o. male who presents today to [facility] for follow up.    HPI:   Exposure to STD        Mr. Vu is a 28-year-old male presents today to follow-up on labs and exposure to an STD.      After his labs I was unable to contact him by phone and then sent him a [employer] message but he did not see that until today.     His HSV-1 was positive along with chlamydia.  I sent doxycycline to the pharmacy at that time. (He did not see the message, so he has not been treated)  Otherwise, his labs were unremarkable.    The following portions of the patient's history were reviewed and updated as appropriate: allergies, current medications, past family history, past medical history, past social history, past surgical history and problem list.    Objective     Objective     Allergy:   No Known Allergies     Current Medications:   Current Outpatient Medications   Medication Sig Dispense Refill    doxycycline (ADOXA) 100 MG tablet Take 1 tablet by mouth 2 (Two) Times a Day for 7 days. 14 tablet 0    valACYclovir (Valtrex) 1000 MG tablet Take 2 tablets by mouth BID for two days as needed for a outbreak, may repeat if needed 30 tablet 5     No current facility-administered medications for this visit.       Past Medical History:  Past Medical History:   Diagnosis Date    Anxiety     Chlamydia        Past Surgical History:  No past surgical history on file.    Social History:  Social History     Socioeconomic History    Marital status: Single   Tobacco Use    Smoking status: Never     Passive exposure: Never    Smokeless tobacco: Never   Vaping Use    Vaping Use: Some days    Substances: Nicotine    Devices: Disposable   Substance and Sexual Activity    Alcohol use: Yes     Alcohol/week: 5.0 standard drinks of alcohol     Types: 3 Cans of beer, 2 Shots of liquor per week     "Drug use: Yes     Types: Marijuana    Sexual activity: Defer       Family History:  Family History   Problem Relation Age of Onset    Hypertension Mother     Lupus Mother     No Known Problems Father     Hypertension Sister     Asthma Brother     Allergies Brother     Eczema Brother          Vital Signs:   /88   Pulse 86   Temp 99.1 °F (37.3 °C) (Infrared)   Ht 171.5 cm (67.52\")   Wt 57.3 kg (126 lb 6.4 oz)   BMI 19.49 kg/m²      Physical Exam:  Physical Exam  Constitutional:       Appearance: He is not ill-appearing.   Eyes:      Pupils: Pupils are equal, round, and reactive to light.   Cardiovascular:      Rate and Rhythm: Normal rate.      Pulses: Normal pulses.   Pulmonary:      Effort: Pulmonary effort is normal.      Breath sounds: Normal breath sounds.   Neurological:      General: No focal deficit present.      Mental Status: He is alert. Mental status is at baseline.   Psychiatric:         Mood and Affect: Mood normal.         Thought Content: Thought content normal.               PHQ-9 Score  PHQ-9 Total Score: 0     Lab Review  Lab on 01/02/2024   Component Date Value Ref Range Status    Chlamydia trachomatis, TERESSA 01/02/2024 Positive (A)  Negative Final    Neisseria gonorrhoeae, TERESSA 01/02/2024 Negative  Negative Final    RPR 01/02/2024 Non-Reactive  Non-Reactive Final    HSV 1 IgG, Type Specific 01/02/2024 26.20 (H)  0.00 - 0.90 index Final                                     Negative        <0.91                                   Equivocal 0.91 - 1.09                                   Positive        >1.09   Note: Negative indicates no antibodies detected to   HSV-1. Equivocal may suggest early infection.  If   clinically appropriate, retest at later date. Positive   indicates antibodies detected to HSV-1.    HSV 2 IgG, Type Spec 01/02/2024 <0.91  0.00 - 0.90 index Final                                     Negative        <0.91                                   Equivocal 0.91 - 1.09            "                        Positive        >1.09   HSV-2 Antibody Interpretation: Current guidelines and   recommendations do not recommend routine screening   for HSV-2 in asymptomatic individuals, including   those that are pregnant. A negative antibody result   indicates no detectable antibodies to HSV-2 were   found. If recent exposure is suspected, retest in 4   to 6 weeks. Equivocal samples should be retested in   4 to 6 weeks. A positive result indicates the   presence of detectable IgG antibody to HSV-2.   FALSE POSITIVE RESULTS MAY OCCUR. Repeat testing, or   testing by a different method, may be indicated in   some settings (e.g. patients with low likelihood of   HSV infection). If clinically appropriate, retest 4   to 6 weeks later. HSV-2 IgG antibody testing results   should be clinically correlated.    HIV DUO 01/02/2024 Non-Reactive  Non-Reactive Final    TSH 01/02/2024 1.780  0.270 - 4.200 uIU/mL Final    Glucose 01/02/2024 91  65 - 99 mg/dL Final    BUN 01/02/2024 11  6 - 20 mg/dL Final    Creatinine 01/02/2024 0.86  0.76 - 1.27 mg/dL Final    Sodium 01/02/2024 140  136 - 145 mmol/L Final    Potassium 01/02/2024 4.3  3.5 - 5.2 mmol/L Final    Slight hemolysis detected by analyzer. Result may be falsely elevated.    Chloride 01/02/2024 105  98 - 107 mmol/L Final    CO2 01/02/2024 24.0  22.0 - 29.0 mmol/L Final    Calcium 01/02/2024 9.9  8.6 - 10.5 mg/dL Final    Total Protein 01/02/2024 7.7  6.0 - 8.5 g/dL Final    Albumin 01/02/2024 5.0  3.5 - 5.2 g/dL Final    ALT (SGPT) 01/02/2024 15  1 - 41 U/L Final    AST (SGOT) 01/02/2024 22  1 - 40 U/L Final    Alkaline Phosphatase 01/02/2024 58  39 - 117 U/L Final    Total Bilirubin 01/02/2024 0.3  0.0 - 1.2 mg/dL Final    Globulin 01/02/2024 2.7  gm/dL Final    A/G Ratio 01/02/2024 1.9  g/dL Final    BUN/Creatinine Ratio 01/02/2024 12.8  7.0 - 25.0 Final    Anion Gap 01/02/2024 11.0  5.0 - 15.0 mmol/L Final    eGFR 01/02/2024 122.5  >60.0 mL/min/1.73 Final     WBC 01/02/2024 5.49  3.40 - 10.80 10*3/mm3 Final    RBC 01/02/2024 4.53  4.14 - 5.80 10*6/mm3 Final    Hemoglobin 01/02/2024 13.6  13.0 - 17.7 g/dL Final    Hematocrit 01/02/2024 41.8  37.5 - 51.0 % Final    MCV 01/02/2024 92.3  79.0 - 97.0 fL Final    MCH 01/02/2024 30.0  26.6 - 33.0 pg Final    MCHC 01/02/2024 32.5  31.5 - 35.7 g/dL Final    RDW 01/02/2024 12.1 (L)  12.3 - 15.4 % Final    RDW-SD 01/02/2024 40.9  37.0 - 54.0 fl Final    MPV 01/02/2024 12.0  6.0 - 12.0 fL Final    Platelets 01/02/2024 309  140 - 450 10*3/mm3 Final    Neutrophil % 01/02/2024 56.5  42.7 - 76.0 % Final    Lymphocyte % 01/02/2024 29.7  19.6 - 45.3 % Final    Monocyte % 01/02/2024 10.9  5.0 - 12.0 % Final    Eosinophil % 01/02/2024 1.1  0.3 - 6.2 % Final    Basophil % 01/02/2024 0.5  0.0 - 1.5 % Final    Immature Grans % 01/02/2024 1.3 (H)  0.0 - 0.5 % Final    Neutrophils, Absolute 01/02/2024 3.10  1.70 - 7.00 10*3/mm3 Final    Lymphocytes, Absolute 01/02/2024 1.63  0.70 - 3.10 10*3/mm3 Final    Monocytes, Absolute 01/02/2024 0.60  0.10 - 0.90 10*3/mm3 Final    Eosinophils, Absolute 01/02/2024 0.06  0.00 - 0.40 10*3/mm3 Final    Basophils, Absolute 01/02/2024 0.03  0.00 - 0.20 10*3/mm3 Final    Immature Grans, Absolute 01/02/2024 0.07 (H)  0.00 - 0.05 10*3/mm3 Final    nRBC 01/02/2024 0.0  0.0 - 0.2 /100 WBC Final        Radiology Results        Assessment / Plan         Assessment and Plan   Diagnoses and all orders for this visit:    1. Chlamydia infection (Primary)  Assessment & Plan:  - asymptomatic. - treat with Doxycyline x 7 days.   Return in one month for recheck.     Orders:  -     doxycycline (ADOXA) 100 MG tablet; Take 1 tablet by mouth 2 (Two) Times a Day for 7 days.  Dispense: 14 tablet; Refill: 0    2. HSV-1 (herpes simplex virus 1) infection  Assessment & Plan:  Rx prn Valtrex     Orders:  -     valACYclovir (Valtrex) 1000 MG tablet; Take 2 tablets by mouth BID for two days as needed for a outbreak, may repeat if needed   Dispense: 30 tablet; Refill: 5        Discussed possible differential diagnoses, testing, treatment, recommended non-pharmacological interventions, risks, warning signs to monitor for that would indicate need for follow-up in clinic or ER. If no improvement with these regimens or you have new or worsening symptoms follow-up. Patient verbalizes understanding and agreement with plan of care. Denies further needs or concerns.     Patient was given instructions and counseling regarding her condition and for health maintenance advised.    BMI is within normal parameters. No other follow-up for BMI required.             Health Maintenance  Health Maintenance: There are no preventive care reminders to display for this patient.     Meds ordered during this visit  New Medications Ordered This Visit   Medications    doxycycline (ADOXA) 100 MG tablet     Sig: Take 1 tablet by mouth 2 (Two) Times a Day for 7 days.     Dispense:  14 tablet     Refill:  0    valACYclovir (Valtrex) 1000 MG tablet     Sig: Take 2 tablets by mouth BID for two days as needed for a outbreak, may repeat if needed     Dispense:  30 tablet     Refill:  5       Meds stopped during this visit:  There are no discontinued medications.     Visit Diagnoses    ICD-10-CM ICD-9-CM   1. Chlamydia infection  A74.9 079.98   2. HSV-1 (herpes simplex virus 1) infection  B00.9 054.9       Patient was given instructions and counseling regarding his condition or for health maintenance advice. Please see specific information pulled into the AVS if appropriate.     Follow Up   No follow-ups on file.          This document has been electronically signed by Carine Talbot DO   February 19, 2024 15:16 EST    Dictated Utilizing Dragon Dictation: Part of this note may be an electronic transcription/translation of spoken language to printed text using the Dragon Dictation System.    Carine Talbot D.O.  [facility]

## 2024-02-20 ENCOUNTER — TELEPHONE (OUTPATIENT)
Dept: FAMILY MEDICINE CLINIC | Facility: CLINIC | Age: 29
End: 2024-02-20
Payer: MEDICAID

## 2024-02-20 RX ORDER — VALACYCLOVIR HYDROCHLORIDE 1 G/1
TABLET, FILM COATED ORAL
Qty: 30 TABLET | Refills: 5 | Status: SHIPPED | OUTPATIENT
Start: 2024-02-20

## 2024-02-20 NOTE — TELEPHONE ENCOUNTER
[facility] PHARMACIST CALLED AND WANTS TO VERIFY THE DOSING FOR VALTREX.  SAID IT IS NORMALLY TWO DOSES, NOT FOR TWO DAYS.    IS THE SCRIPT CORRECT THAT WAS SENT OVER TO [facility]?    PHONE FOR  PHARMACY [phone]

## 2024-02-27 ENCOUNTER — OFFICE VISIT (OUTPATIENT)
Dept: FAMILY MEDICINE CLINIC | Facility: CLINIC | Age: 29
End: 2024-02-27
Payer: MEDICAID

## 2024-02-27 ENCOUNTER — LAB (OUTPATIENT)
Dept: LAB | Facility: HOSPITAL | Age: 29
End: 2024-02-27
Payer: MEDICAID

## 2024-02-27 VITALS
SYSTOLIC BLOOD PRESSURE: 122 MMHG | WEIGHT: 125 LBS | BODY MASS INDEX: 19.62 KG/M2 | DIASTOLIC BLOOD PRESSURE: 80 MMHG | HEIGHT: 67 IN | OXYGEN SATURATION: 97 % | HEART RATE: 72 BPM | TEMPERATURE: 98.7 F

## 2024-02-27 DIAGNOSIS — B00.9 HSV-1 (HERPES SIMPLEX VIRUS 1) INFECTION: ICD-10-CM

## 2024-02-27 DIAGNOSIS — Z11.1 SCREENING-PULMONARY TB: ICD-10-CM

## 2024-02-27 DIAGNOSIS — Z02.89 ENCOUNTER FOR PHYSICAL EXAMINATION RELATED TO EMPLOYMENT: Primary | ICD-10-CM

## 2024-02-27 PROCEDURE — 36415 COLL VENOUS BLD VENIPUNCTURE: CPT

## 2024-02-27 PROCEDURE — 86480 TB TEST CELL IMMUN MEASURE: CPT

## 2024-02-27 RX ORDER — VALACYCLOVIR HYDROCHLORIDE 1 G/1
TABLET, FILM COATED ORAL
Qty: 30 TABLET | Refills: 5 | Status: SHIPPED | OUTPATIENT
Start: 2024-02-27

## 2024-02-27 NOTE — PROGRESS NOTES
Subjective     Chief Complaint  Annual Exam    Subjective          Willy Vu is a 28 y.o. male who presents today to [facility] for  Annual Exam  .    HPI:   History of Present Illness    Mr. uV is a very pleasant 28-year-old male who presents today to have a annual physical exam.  He also needs a physical for employment purposes.    Labs from 1/2/24 reviewed     The following portions of the patient's history were reviewed and updated as appropriate: allergies, current medications, past family history, past medical history, past social history, past surgical history and problem list.    Objective     Objective     Allergy:   No Known Allergies     Current Medications:   Current Outpatient Medications   Medication Sig Dispense Refill    valACYclovir (Valtrex) 1000 MG tablet Take 2 tablets by mouth BID for one days as needed for a outbreak, may repeat if needed 30 tablet 5     No current facility-administered medications for this visit.       Past Medical History:  Past Medical History:   Diagnosis Date    Anxiety     Chlamydia        Past Surgical History:  No past surgical history on file.    Social History:  Social History     Socioeconomic History    Marital status: Single   Tobacco Use    Smoking status: Never     Passive exposure: Never    Smokeless tobacco: Never   Vaping Use    Vaping Use: Some days    Substances: Nicotine    Devices: Disposable   Substance and Sexual Activity    Alcohol use: Yes     Alcohol/week: 5.0 standard drinks of alcohol     Types: 3 Cans of beer, 2 Shots of liquor per week    Drug use: Yes     Types: Marijuana    Sexual activity: Defer       Family History:  Family History   Problem Relation Age of Onset    Hypertension Mother     Lupus Mother     No Known Problems Father     Hypertension Sister     Asthma Brother     Allergies Brother     Eczema Brother          Vital Signs:   /80   Pulse 72   Temp 98.7 °F (37.1 °C)    "Ht 170.8 cm (67.25\")   Wt 56.7 kg (125 lb)   SpO2 97%   BMI 19.43 kg/m²      Physical Exam:  Physical Exam  Constitutional:       Appearance: He is not ill-appearing.   HENT:      Right Ear: Tympanic membrane normal.      Left Ear: Tympanic membrane normal.   Eyes:      Pupils: Pupils are equal, round, and reactive to light.   Cardiovascular:      Rate and Rhythm: Normal rate.      Pulses: Normal pulses.   Pulmonary:      Effort: Pulmonary effort is normal.      Breath sounds: Normal breath sounds.   Neurological:      General: No focal deficit present.      Mental Status: He is alert. Mental status is at baseline.               PHQ-9 Score  PHQ-9 Total Score:       Lab Review  Lab on 01/02/2024   Component Date Value Ref Range Status    Chlamydia trachomatis, TERESSA 01/02/2024 Positive (A)  Negative Final    Neisseria gonorrhoeae, TERESSA 01/02/2024 Negative  Negative Final    RPR 01/02/2024 Non-Reactive  Non-Reactive Final    HSV 1 IgG, Type Specific 01/02/2024 26.20 (H)  0.00 - 0.90 index Final                                     Negative        <0.91                                   Equivocal 0.91 - 1.09                                   Positive        >1.09   Note: Negative indicates no antibodies detected to   HSV-1. Equivocal may suggest early infection.  If   clinically appropriate, retest at later date. Positive   indicates antibodies detected to HSV-1.    HSV 2 IgG, Type Spec 01/02/2024 <0.91  0.00 - 0.90 index Final                                     Negative        <0.91                                   Equivocal 0.91 - 1.09                                   Positive        >1.09   HSV-2 Antibody Interpretation: Current guidelines and   recommendations do not recommend routine screening   for HSV-2 in asymptomatic individuals, including   those that are pregnant. A negative antibody result   indicates no detectable antibodies to HSV-2 were   found. If recent exposure is suspected, retest in 4   to 6 " weeks. Equivocal samples should be retested in   4 to 6 weeks. A positive result indicates the   presence of detectable IgG antibody to HSV-2.   FALSE POSITIVE RESULTS MAY OCCUR. Repeat testing, or   testing by a different method, may be indicated in   some settings (e.g. patients with low likelihood of   HSV infection). If clinically appropriate, retest 4   to 6 weeks later. HSV-2 IgG antibody testing results   should be clinically correlated.    HIV DUO 01/02/2024 Non-Reactive  Non-Reactive Final    TSH 01/02/2024 1.780  0.270 - 4.200 uIU/mL Final    Glucose 01/02/2024 91  65 - 99 mg/dL Final    BUN 01/02/2024 11  6 - 20 mg/dL Final    Creatinine 01/02/2024 0.86  0.76 - 1.27 mg/dL Final    Sodium 01/02/2024 140  136 - 145 mmol/L Final    Potassium 01/02/2024 4.3  3.5 - 5.2 mmol/L Final    Slight hemolysis detected by analyzer. Result may be falsely elevated.    Chloride 01/02/2024 105  98 - 107 mmol/L Final    CO2 01/02/2024 24.0  22.0 - 29.0 mmol/L Final    Calcium 01/02/2024 9.9  8.6 - 10.5 mg/dL Final    Total Protein 01/02/2024 7.7  6.0 - 8.5 g/dL Final    Albumin 01/02/2024 5.0  3.5 - 5.2 g/dL Final    ALT (SGPT) 01/02/2024 15  1 - 41 U/L Final    AST (SGOT) 01/02/2024 22  1 - 40 U/L Final    Alkaline Phosphatase 01/02/2024 58  39 - 117 U/L Final    Total Bilirubin 01/02/2024 0.3  0.0 - 1.2 mg/dL Final    Globulin 01/02/2024 2.7  gm/dL Final    A/G Ratio 01/02/2024 1.9  g/dL Final    BUN/Creatinine Ratio 01/02/2024 12.8  7.0 - 25.0 Final    Anion Gap 01/02/2024 11.0  5.0 - 15.0 mmol/L Final    eGFR 01/02/2024 122.5  >60.0 mL/min/1.73 Final    WBC 01/02/2024 5.49  3.40 - 10.80 10*3/mm3 Final    RBC 01/02/2024 4.53  4.14 - 5.80 10*6/mm3 Final    Hemoglobin 01/02/2024 13.6  13.0 - 17.7 g/dL Final    Hematocrit 01/02/2024 41.8  37.5 - 51.0 % Final    MCV 01/02/2024 92.3  79.0 - 97.0 fL Final    MCH 01/02/2024 30.0  26.6 - 33.0 pg Final    MCHC 01/02/2024 32.5  31.5 - 35.7 g/dL Final    RDW 01/02/2024 12.1 (L)   12.3 - 15.4 % Final    RDW-SD 01/02/2024 40.9  37.0 - 54.0 fl Final    MPV 01/02/2024 12.0  6.0 - 12.0 fL Final    Platelets 01/02/2024 309  140 - 450 10*3/mm3 Final    Neutrophil % 01/02/2024 56.5  42.7 - 76.0 % Final    Lymphocyte % 01/02/2024 29.7  19.6 - 45.3 % Final    Monocyte % 01/02/2024 10.9  5.0 - 12.0 % Final    Eosinophil % 01/02/2024 1.1  0.3 - 6.2 % Final    Basophil % 01/02/2024 0.5  0.0 - 1.5 % Final    Immature Grans % 01/02/2024 1.3 (H)  0.0 - 0.5 % Final    Neutrophils, Absolute 01/02/2024 3.10  1.70 - 7.00 10*3/mm3 Final    Lymphocytes, Absolute 01/02/2024 1.63  0.70 - 3.10 10*3/mm3 Final    Monocytes, Absolute 01/02/2024 0.60  0.10 - 0.90 10*3/mm3 Final    Eosinophils, Absolute 01/02/2024 0.06  0.00 - 0.40 10*3/mm3 Final    Basophils, Absolute 01/02/2024 0.03  0.00 - 0.20 10*3/mm3 Final    Immature Grans, Absolute 01/02/2024 0.07 (H)  0.00 - 0.05 10*3/mm3 Final    nRBC 01/02/2024 0.0  0.0 - 0.2 /100 WBC Final        Radiology Results        Assessment / Plan         Assessment and Plan   Diagnoses and all orders for this visit:    1. Encounter for physical examination related to employment (Primary)  Assessment & Plan:  Exam completed, form will be filled out when results from quantiferon test is available and faxed to employer       2. HSV-1 (herpes simplex virus 1) infection  -     valACYclovir (Valtrex) 1000 MG tablet; Take 2 tablets by mouth BID for one days as needed for a outbreak, may repeat if needed  Dispense: 30 tablet; Refill: 5    3. Screening-pulmonary TB  -     QuantiFERON TB Gold; Future        Discussed possible differential diagnoses, testing, treatment, recommended non-pharmacological interventions, risks, warning signs to monitor for that would indicate need for follow-up in clinic or ER. If no improvement with these regimens or you have new or worsening symptoms follow-up. Patient verbalizes understanding and agreement with plan of care. Denies further needs or concerns.      Patient was given instructions and counseling regarding her condition and for health maintenance advised.    BMI is within normal parameters. No other follow-up for BMI required.          Health Maintenance  Health Maintenance: There are no preventive care reminders to display for this patient.     Meds ordered during this visit  New Medications Ordered This Visit   Medications    valACYclovir (Valtrex) 1000 MG tablet     Sig: Take 2 tablets by mouth BID for one days as needed for a outbreak, may repeat if needed     Dispense:  30 tablet     Refill:  5       Meds stopped during this visit:  Medications Discontinued During This Encounter   Medication Reason    valACYclovir (Valtrex) 1000 MG tablet Reorder        Visit Diagnoses    ICD-10-CM ICD-9-CM   1. Encounter for physical examination related to employment  Z02.89 V70.5   2. HSV-1 (herpes simplex virus 1) infection  B00.9 054.9   3. Screening-pulmonary TB  Z11.1 V74.1       Patient was given instructions and counseling regarding his condition or for health maintenance advice. Please see specific information pulled into the AVS if appropriate.     Follow Up   No follow-ups on file.          This document has been electronically signed by Carine Talbot DO   February 27, 2024 09:48 EST    Dictated Utilizing Dragon Dictation: Part of this note may be an electronic transcription/translation of spoken language to printed text using the Dragon Dictation System.    Carine Talbot D.O.  [facility] [place]

## 2024-02-27 NOTE — ASSESSMENT & PLAN NOTE
Exam completed, form will be filled out when results from quantiferon test is available and faxed to employer

## 2024-02-29 LAB
GAMMA INTERFERON BACKGROUND BLD IA-ACNC: 0.02 IU/ML
M TB IFN-G BLD-IMP: NEGATIVE
M TB IFN-G CD4+ BCKGRND COR BLD-ACNC: 0.03 IU/ML
M TB IFN-G CD4+CD8+ BCKGRND COR BLD-ACNC: 0.01 IU/ML
MITOGEN IGNF BCKGRD COR BLD-ACNC: >10 IU/ML
SERVICE CMNT-IMP: NORMAL

## 2024-04-16 ENCOUNTER — HOSPITAL ENCOUNTER (EMERGENCY)
Facility: HOSPITAL | Age: 29
Discharge: HOME | End: 2024-04-16
Attending: EMERGENCY MEDICINE
Payer: MEDICAID

## 2024-04-16 ENCOUNTER — OFFICE VISIT (OUTPATIENT)
Dept: FAMILY MEDICINE CLINIC | Facility: CLINIC | Age: 29
End: 2024-04-16
Payer: MEDICAID

## 2024-04-16 VITALS
HEART RATE: 65 BPM | OXYGEN SATURATION: 97 % | DIASTOLIC BLOOD PRESSURE: 78 MMHG | TEMPERATURE: 97 F | BODY MASS INDEX: 19.78 KG/M2 | WEIGHT: 126 LBS | HEIGHT: 67 IN | SYSTOLIC BLOOD PRESSURE: 120 MMHG

## 2024-04-16 VITALS
HEIGHT: 67 IN | SYSTOLIC BLOOD PRESSURE: 121 MMHG | OXYGEN SATURATION: 97 % | DIASTOLIC BLOOD PRESSURE: 87 MMHG | RESPIRATION RATE: 16 BRPM | BODY MASS INDEX: 19.62 KG/M2 | TEMPERATURE: 98.4 F | HEART RATE: 67 BPM | WEIGHT: 125 LBS

## 2024-04-16 DIAGNOSIS — S61.411D LACERATION OF RIGHT HAND WITHOUT FOREIGN BODY, SUBSEQUENT ENCOUNTER: ICD-10-CM

## 2024-04-16 DIAGNOSIS — F41.1 GAD (GENERALIZED ANXIETY DISORDER): Primary | ICD-10-CM

## 2024-04-16 DIAGNOSIS — S61.411A: Primary | ICD-10-CM

## 2024-04-16 PROCEDURE — 90471 IMMUNIZATION ADMIN: CPT | Performed by: PHYSICIAN ASSISTANT

## 2024-04-16 PROCEDURE — 1160F RVW MEDS BY RX/DR IN RCRD: CPT | Performed by: FAMILY MEDICINE

## 2024-04-16 PROCEDURE — 99214 OFFICE O/P EST MOD 30 MIN: CPT | Performed by: FAMILY MEDICINE

## 2024-04-16 PROCEDURE — 99281 EMR DPT VST MAYX REQ PHY/QHP: CPT

## 2024-04-16 PROCEDURE — 90715 TDAP VACCINE 7 YRS/> IM: CPT | Performed by: PHYSICIAN ASSISTANT

## 2024-04-16 PROCEDURE — 25010000002 TETANUS-DIPHTH-ACELL PERTUSSIS 5-2.5-18.5 LF-MCG/0.5 SUSPENSION PREFILLED SYRINGE: Performed by: PHYSICIAN ASSISTANT

## 2024-04-16 PROCEDURE — 99282 EMERGENCY DEPT VISIT SF MDM: CPT

## 2024-04-16 PROCEDURE — 1159F MED LIST DOCD IN RCRD: CPT | Performed by: FAMILY MEDICINE

## 2024-04-16 RX ORDER — BUSPIRONE HYDROCHLORIDE 7.5 MG/1
7.5 TABLET ORAL 3 TIMES DAILY
Qty: 90 TABLET | Refills: 1 | Status: SHIPPED | OUTPATIENT
Start: 2024-04-16

## 2024-04-16 RX ORDER — LIDOCAINE HYDROCHLORIDE 10 MG/ML
5 INJECTION, SOLUTION EPIDURAL; INFILTRATION; INTRACAUDAL; PERINEURAL ONCE
Status: COMPLETED | OUTPATIENT
Start: 2024-04-16 | End: 2024-04-16

## 2024-04-16 RX ADMIN — TETANUS TOXOID, REDUCED DIPHTHERIA TOXOID AND ACELLULAR PERTUSSIS VACCINE, ADSORBED 0.5 ML: 5; 2.5; 8; 8; 2.5 SUSPENSION INTRAMUSCULAR at 09:50

## 2024-04-16 RX ADMIN — LIDOCAINE HYDROCHLORIDE 5 ML: 10 INJECTION, SOLUTION EPIDURAL; INFILTRATION; INTRACAUDAL; PERINEURAL at 10:30

## 2024-04-16 NOTE — DISCHARGE INSTRUCTIONS
Keep wound clean and dry.  Change the dressing daily.  Tylenol or Motrin as directed for pain.  Return in 7 days for suture removal.  Follow-up as needed.

## 2024-04-16 NOTE — LETTER
April 16, 2024     Patient: Willy Vu   YOB: 1995   Date of Visit: 4/16/2024       To Whom It May Concern:    It is my medical opinion that Willy Vu may return to work on 4/17/2024 .    Please excuse him for dates 4/11, 4/12, 4/15 and 4/16/2024.          Sincerely,          Carine Talbot DO    CC:   No Recipients

## 2024-04-16 NOTE — ASSESSMENT & PLAN NOTE
Psychological condition is worsening.  Medication changes per orders.  Referral to psychological counseling.  Psychological condition  will be reassessed in 4 weeks.      I had a long discussion with this patient about medication options.   After, discussion will start: BuSpar 7.5 mg 3 times daily  Discussed with patient that it can take 4-6 weeks before they sees the best benefit in symptoms.   They deny SI/HI

## 2024-04-16 NOTE — ED PROVIDER NOTES
Subjective   History of Present Illness  Mr. Vu is a 28 year old male with no known health issues who presents to the emergency department with complaints of lacerations to the right hand at the base of the fifth finger and to the tip of the right fourth finger.  The patient states that he was trying to open a can of beans with a can opener and it was not working properly.  He attempted to use a pocket knife to open the can and in doing so, his fingers and hand went across the edge of the open can lid, resulting in lacerations.  The patient denies any loss of function or sensation.  No active bleeding.  He does not recall when his last tetanus shot was.      Review of Systems   Respiratory:  Negative for cough and shortness of breath.    Musculoskeletal:         Right hand pain secondary to lacerations   Skin:  Positive for wound (Minor laceration to the right palmar hand near the base of the right fifth finger and a tiny laceration to the tip of the right fourth finger.  No nail involvement.  No deformity.  Normal range of motion and function.).   Neurological:  Negative for numbness.   Psychiatric/Behavioral: Negative.         Past Medical History:   Diagnosis Date    Anxiety     Chlamydia        No Known Allergies    No past surgical history on file.    Family History   Problem Relation Age of Onset    Hypertension Mother     Lupus Mother     No Known Problems Father     Hypertension Sister     Asthma Brother     Allergies Brother     Eczema Brother        Social History     Socioeconomic History    Marital status: Single   Tobacco Use    Smoking status: Never     Passive exposure: Never    Smokeless tobacco: Never   Vaping Use    Vaping status: Some Days    Substances: Nicotine    Devices: Disposable   Substance and Sexual Activity    Alcohol use: Yes     Alcohol/week: 5.0 standard drinks of alcohol     Types: 3 Cans of beer, 2 Shots of liquor per week    Drug use: Yes     Types: Marijuana    Sexual  activity: Defer           Objective   Physical Exam  Constitutional:       General: He is not in acute distress.     Appearance: Normal appearance.   HENT:      Head: Normocephalic.      Nose: Nose normal.      Mouth/Throat:      Mouth: Mucous membranes are moist.   Eyes:      Conjunctiva/sclera: Conjunctivae normal.      Pupils: Pupils are equal, round, and reactive to light.   Cardiovascular:      Rate and Rhythm: Normal rate.      Pulses: Normal pulses.   Pulmonary:      Effort: Pulmonary effort is normal.   Musculoskeletal:      Cervical back: Neck supple.      Comments: 1.5 cm laceration to the palmar surface of the right hand near the base of the fifth digit.  1 cm laceration to the tip of the right fourth finger distal phalanx.  No nail involvement.  Normal function and sensation.   Skin:     General: Skin is warm and dry.   Neurological:      Mental Status: He is alert and oriented to person, place, and time.   Psychiatric:         Mood and Affect: Mood normal.         Laceration Repair    Date/Time: 4/16/2024 10:51 AM    Performed by: Nadir Melendez PA  Authorized by: Min Zhang MD    Consent:     Consent obtained:  Verbal    Consent given by:  Patient    Risks, benefits, and alternatives were discussed: yes      Risks discussed:  Infection, pain, tendon damage, vascular damage and nerve damage  Universal protocol:     Procedure explained and questions answered to patient or proxy's satisfaction: yes      Patient identity confirmed:  Verbally with patient  Anesthesia:     Anesthesia method:  Local infiltration    Local anesthetic:  Lidocaine 1% w/o epi  Laceration details:     Location:  Hand    Hand location:  R palm    Length (cm):  2  Pre-procedure details:     Preparation:  Patient was prepped and draped in usual sterile fashion  Exploration:     Limited defect created (wound extended): no      Hemostasis achieved with:  Direct pressure    Wound exploration: wound explored through full range  of motion and entire depth of wound visualized      Wound extent: nerve damage, tendon damage, underlying fracture and vascular damage    Treatment:     Area cleansed with:  Povidone-iodine and saline    Amount of cleaning:  Standard    Irrigation solution:  Sterile saline    Irrigation method:  Syringe  Skin repair:     Repair method:  Sutures    Suture size:  3-0    Suture material:  Nylon    Suture technique:  Simple interrupted    Number of sutures:  3  Approximation:     Approximation:  Close  Repair type:     Repair type:  Simple  Post-procedure details:     Dressing:  Sterile dressing    Procedure completion:  Tolerated well, no immediate complications             ED Course      In summary, healthy 28-year-old male presents with small lacerations to the right hand as a result of cutting them on a lid from a being can this morning.  He sustained a small laceration to the palmar aspect of the right hand near the base of the fifth finger and a smaller laceration about 1 cm to the tip of the right fourth finger distal phalanx.  No nail involvement.  No apparent tendon or vascular injury.    Patient will be given a tetanus shot and wound near the base of the fifth finger but will be cleansed and sutured.  The small laceration at the tip of the fourth finger does not require any suturing.    No indication for imaging at this time.  Patient will be given a tetanus booster as well.                                           Medical Decision Making  Risk  Prescription drug management.        Final diagnoses:   Laceration of right hand without complication, including fingers, initial encounter       ED Disposition  ED Disposition       ED Disposition   Discharge    Condition   Stable    Comment   --               Carine Talbot DO  [address] [place] [id number]  [phone]      As needed    [facility] EMERGENCY DEPARTMENT  [facility]  [id number]  [phone]  In 7 days  For suture removal         Medication List      No changes were made to your prescriptions during this visit.            Nadir Melendez, PA  04/16/24 1059

## 2024-04-16 NOTE — PROGRESS NOTES
Subjective     Chief Complaint  Vomiting, Nausea, and Anxiety    Subjective          Adia Vu is a 28 y.o. male who presents today to [facility] for initial evaluation .    HPI:   History of Present Illness    Adia is a very pleasant 28-year-old male presents today with complaint of severe anxiety.  He has a history of anxiety and is not currently taking any medications.  He typically is able to manage without medications but has had a severe increase in symptoms recently.  He has been very tearful and having very significant vomiting episodes as well.  He reports that he has missed multiple days of work due to his anxiety.  His anxiety causes GI symptoms and then he starts to vomit.  This is affecting his life and multiple factors including his relationship and his work environment.      Additionally, he had a injury to his right hand yesterday while cooking, he was seen in the ER and repaired her laceration was performed with sutures.  He was informed to follow-up in 1 week for suture removal.  He got a Tdap vaccine during that time.    The following portions of the patient's history were reviewed and updated as appropriate: allergies, current medications, past family history, past medical history, past social history, past surgical history and problem list.    Objective     Objective     Allergy:   No Known Allergies     Current Medications:   Current Outpatient Medications   Medication Sig Dispense Refill    busPIRone (BUSPAR) 7.5 MG tablet Take 1 tablet by mouth 3 (Three) Times a Day. 90 tablet 1    valACYclovir (Valtrex) 1000 MG tablet Take 2 tablets by mouth BID for one days as needed for a outbreak, may repeat if needed (Patient not taking: Reported on 4/16/2024) 30 tablet 5     No current facility-administered medications for this visit.       Past Medical History:  Past Medical History:   Diagnosis Date    Anxiety     Chlamydia        Past  "Surgical History:  History reviewed. No pertinent surgical history.    Social History:  Social History     Socioeconomic History    Marital status: Single   Tobacco Use    Smoking status: Never     Passive exposure: Never    Smokeless tobacco: Never   Vaping Use    Vaping status: Some Days    Substances: Nicotine    Devices: Disposable   Substance and Sexual Activity    Alcohol use: Yes     Alcohol/week: 5.0 standard drinks of alcohol     Types: 3 Cans of beer, 2 Shots of liquor per week     Comment: social    Drug use: Yes     Types: Marijuana    Sexual activity: Defer       Family History:  Family History   Problem Relation Age of Onset    Hypertension Mother     Lupus Mother     No Known Problems Father     Hypertension Sister     Asthma Brother     Allergies Brother     Eczema Brother        Vital Signs:   /78   Pulse 65   Temp 97 °F (36.1 °C) (Temporal)   Ht 170.2 cm (67.01\")   Wt 57.2 kg (126 lb)   SpO2 97%   BMI 19.73 kg/m²      Physical Exam:  Physical Exam  Vitals reviewed.   Constitutional:       Appearance: He is not ill-appearing.   Eyes:      Pupils: Pupils are equal, round, and reactive to light.   Cardiovascular:      Rate and Rhythm: Normal rate.      Pulses: Normal pulses.   Pulmonary:      Effort: Pulmonary effort is normal.      Breath sounds: Normal breath sounds.   Neurological:      General: No focal deficit present.      Mental Status: He is alert. Mental status is at baseline.   Psychiatric:         Mood and Affect: Mood normal.         Behavior: Behavior normal.         Thought Content: Thought content normal.         Judgment: Judgment normal.               PHQ-9 Score  PHQ-9 Total Score:       Lab Review  Lab on 02/27/2024   Component Date Value Ref Range Status    QuantiFERON Criteria 02/27/2024 Comment   Final    QuantiFERON-TB Gold Plus is a qualitative indirect test for  M tuberculosis infection (including disease) and is intended for use  in conjunction with risk " assessment, radiography, and other medical  and diagnostic evaluations. The QuantiFERON-TB Gold Plus result is  determined by subtracting the Nil value from either TB antigen (Ag)  value. The Mitogen tube serves as a control for the test.    QUANTIFERON TB1 AG VALUE 02/27/2024 0.03  IU/mL Final    QUANTIFERON TB2 AG VALUE 02/27/2024 0.01  IU/mL Final    QuantiFERON Nil Value 02/27/2024 0.02  IU/mL Final    QuantiFERON Mitogen Value 02/27/2024 >10.00  IU/mL Final    QUANTIFERON-TB GOLD PLUS 02/27/2024 Negative  Negative Final    No response to M tuberculosis antigens detected.  Infection with M tuberculosis is unlikely, but high risk  individuals should be considered for additional testing  (ATS/IDSA/CDC Clinical Practice Guidelines, 2017). The  reference range is an Antigen minus Nil result of <0.35 IU/mL.  The specimen received for QuantiFERON testing was incubated by the  ordering institution. Specific procedures outlined in our Directory  of Services and in the package insert for the QuantiFERON Gold  (In Tube) test must be followed to enable for proper stimulation of  cells for the production of interferon gamma.  Chemiluminescence immunoassay methodology        Radiology Results        Assessment / Plan         Assessment and Plan   Diagnoses and all orders for this visit:    1. CURTIS (generalized anxiety disorder) (Primary)  Assessment & Plan:  Psychological condition is worsening.  Medication changes per orders.  Referral to psychological counseling.  Psychological condition  will be reassessed in 4 weeks.      I had a long discussion with this patient about medication options.   After, discussion will start: BuSpar 7.5 mg 3 times daily  Discussed with patient that it can take 4-6 weeks before they sees the best benefit in symptoms.   They deny SI/HI      Orders:  -     busPIRone (BUSPAR) 7.5 MG tablet; Take 1 tablet by mouth 3 (Three) Times a Day.  Dispense: 90 tablet; Refill: 1  -     Ambulatory Referral to  Behavioral Health    2. Laceration of right hand without foreign body, subsequent encounter  Assessment & Plan:  Patient to follow-up in 1 week for suture removal            Discussed possible differential diagnoses, testing, treatment, recommended non-pharmacological interventions, risks, warning signs to monitor for that would indicate need for follow-up in clinic or ER. If no improvement with these regimens or you have new or worsening symptoms follow-up. Patient verbalizes understanding and agreement with plan of care. Denies further needs or concerns.     Patient was given instructions and counseling regarding her condition and for health maintenance advised.    BMI is within normal parameters. No other follow-up for BMI required.       Health Maintenance  Health Maintenance: There are no preventive care reminders to display for this patient.     Meds ordered during this visit  New Medications Ordered This Visit   Medications    busPIRone (BUSPAR) 7.5 MG tablet     Sig: Take 1 tablet by mouth 3 (Three) Times a Day.     Dispense:  90 tablet     Refill:  1       Meds stopped during this visit:  There are no discontinued medications.     Visit Diagnoses    ICD-10-CM ICD-9-CM   1. CURTIS (generalized anxiety disorder)  F41.1 300.02   2. Laceration of right hand without foreign body, subsequent encounter  S61.411D V58.89       Patient was given instructions and counseling regarding his condition or for health maintenance advice. Please see specific information pulled into the AVS if appropriate.     Follow Up   Return in about 1 week (around 4/23/2024) for followup and stich removal .          This document has been electronically signed by Carine Talbot DO   April 16, 2024 14:04 EDT    Dictated Utilizing Dragon Dictation: Part of this note may be an electronic transcription/translation of spoken language to printed text using the Dragon Dictation System.    Carine Talbot D.O.  [facility]

## 2024-04-23 ENCOUNTER — OFFICE VISIT (OUTPATIENT)
Dept: FAMILY MEDICINE CLINIC | Facility: CLINIC | Age: 29
End: 2024-04-23
Payer: MEDICAID

## 2024-04-23 VITALS
WEIGHT: 127.4 LBS | HEIGHT: 67 IN | TEMPERATURE: 100 F | HEART RATE: 76 BPM | DIASTOLIC BLOOD PRESSURE: 80 MMHG | BODY MASS INDEX: 20 KG/M2 | SYSTOLIC BLOOD PRESSURE: 120 MMHG

## 2024-04-23 DIAGNOSIS — S61.411D LACERATION OF RIGHT HAND WITHOUT FOREIGN BODY, SUBSEQUENT ENCOUNTER: Primary | ICD-10-CM

## 2024-04-23 PROCEDURE — 1159F MED LIST DOCD IN RCRD: CPT | Performed by: FAMILY MEDICINE

## 2024-04-23 PROCEDURE — S0630 REMOVAL OF SUTURES: HCPCS | Performed by: FAMILY MEDICINE

## 2024-04-23 PROCEDURE — 1160F RVW MEDS BY RX/DR IN RCRD: CPT | Performed by: FAMILY MEDICINE

## 2024-04-23 NOTE — PROGRESS NOTES
Subjective     Chief Complaint  Suture / Staple Removal    Subjective          Willy Vu is a 28 y.o. male who presents today to [facility] for follow up.    HPI:   Suture / Staple Removal        This patient is a very pleasant 28-year-old male who presents today to follow-up after a injury to his right hand.  It occurred on 4/16/2024.  He was seen in the ER and 3 sutures were placed.  He presents today for suture removal.  He has no acute complaints and reports that the incision has healed well.  He did get a Tdap vaccine during the time of his ER visit.    The following portions of the patient's history were reviewed and updated as appropriate: allergies, current medications, past family history, past medical history, past social history, past surgical history and problem list.    Objective     Objective     Allergy:   No Known Allergies     Current Medications:   Current Outpatient Medications   Medication Sig Dispense Refill    busPIRone (BUSPAR) 7.5 MG tablet Take 1 tablet by mouth 3 (Three) Times a Day. 90 tablet 1    valACYclovir (Valtrex) 1000 MG tablet Take 2 tablets by mouth BID for one days as needed for a outbreak, may repeat if needed 30 tablet 5     No current facility-administered medications for this visit.       Past Medical History:  Past Medical History:   Diagnosis Date    Anxiety     Chlamydia        Past Surgical History:  History reviewed. No pertinent surgical history.    Social History:  Social History     Socioeconomic History    Marital status: Single   Tobacco Use    Smoking status: Never     Passive exposure: Never    Smokeless tobacco: Never   Vaping Use    Vaping status: Some Days    Substances: Nicotine    Devices: Disposable   Substance and Sexual Activity    Alcohol use: Yes     Alcohol/week: 5.0 standard drinks of alcohol     Types: 3 Cans of beer, 2 Shots of liquor per week     Comment: social    Drug use: Yes     Types:  "Marijuana    Sexual activity: Defer       Family History:  Family History   Problem Relation Age of Onset    Hypertension Mother     Lupus Mother     No Known Problems Father     Hypertension Sister     Asthma Brother     Allergies Brother     Eczema Brother        Vital Signs:   /80   Pulse 76   Temp 100 °F (37.8 °C) (Infrared)   Ht 170.2 cm (67.01\")   Wt 57.8 kg (127 lb 6.4 oz)   BMI 19.95 kg/m²      Physical Exam:  Physical Exam  Vitals reviewed.   Skin:     Comments: Laceration to medial right hand healing well with 3 sutures in place.  Sutures were removed and wound was dressed.  No evidence of infection.         Suture Removal    Date/Time: 4/23/2024 3:55 PM    Performed by: Carine Talbot DO  Authorized by: Carine Talbot DO  Consent: Verbal consent obtained.  Risks and benefits: risks, benefits and alternatives were discussed  Consent given by: patient  Patient understanding: patient states understanding of the procedure being performed  Patient identity confirmed: verbally with patient  Time out: Immediately prior to procedure a \"time out\" was called to verify the correct patient, procedure, equipment, support staff and site/side marked as required.  Body area: upper extremity  Location details: right hand  Wound Appearance: clean  Sutures Removed: 3  Post-removal: antibiotic ointment applied and dressing applied  Facility: sutures placed in this facility  Patient tolerance: patient tolerated the procedure well with no immediate complications           PHQ-9 Score  PHQ-9 Total Score:       Lab Review  Lab on 02/27/2024   Component Date Value Ref Range Status    QuantiFERON Criteria 02/27/2024 Comment   Final    QuantiFERON-TB Gold Plus is a qualitative indirect test for  M tuberculosis infection (including disease) and is intended for use  in conjunction with risk assessment, radiography, and other medical  and diagnostic evaluations. The QuantiFERON-TB Gold Plus result is  determined by " subtracting the Nil value from either TB antigen (Ag)  value. The Mitogen tube serves as a control for the test.    QUANTIFERON TB1 AG VALUE 02/27/2024 0.03  IU/mL Final    QUANTIFERON TB2 AG VALUE 02/27/2024 0.01  IU/mL Final    QuantiFERON Nil Value 02/27/2024 0.02  IU/mL Final    QuantiFERON Mitogen Value 02/27/2024 >10.00  IU/mL Final    QUANTIFERON-TB GOLD PLUS 02/27/2024 Negative  Negative Final    No response to M tuberculosis antigens detected.  Infection with M tuberculosis is unlikely, but high risk  individuals should be considered for additional testing  (ATS/IDSA/CDC Clinical Practice Guidelines, 2017). The  reference range is an Antigen minus Nil result of <0.35 IU/mL.  The specimen received for QuantiFERON testing was incubated by the  ordering institution. Specific procedures outlined in our Directory  of Services and in the package insert for the QuantiFERON Gold  (In Tube) test must be followed to enable for proper stimulation of  cells for the production of interferon gamma.  Chemiluminescence immunoassay methodology        Radiology Results        Assessment / Plan         Assessment and Plan   Diagnoses and all orders for this visit:    1. Laceration of right hand without foreign body, subsequent encounter (Primary)  -     Suture Removal    Sutures removed.  No evidence of infection.  Discussed wound care.  Follow-up as needed.    Discussed possible differential diagnoses, testing, treatment, recommended non-pharmacological interventions, risks, warning signs to monitor for that would indicate need for follow-up in clinic or ER. If no improvement with these regimens or you have new or worsening symptoms follow-up. Patient verbalizes understanding and agreement with plan of care. Denies further needs or concerns.     Patient was given instructions and counseling regarding her condition and for health maintenance advised.    BMI is within normal parameters. No other follow-up for BMI  required.        Health Maintenance  Health Maintenance: There are no preventive care reminders to display for this patient.     Meds ordered during this visit  No orders of the defined types were placed in this encounter.      Meds stopped during this visit:  There are no discontinued medications.     Visit Diagnoses    ICD-10-CM ICD-9-CM   1. Laceration of right hand without foreign body, subsequent encounter  S61.411D V58.89       Patient was given instructions and counseling regarding his condition or for health maintenance advice. Please see specific information pulled into the AVS if appropriate.     Follow Up   Return for Next scheduled follow up.          This document has been electronically signed by Carine Talbot DO   April 23, 2024 15:57 EDT    Dictated Utilizing Dragon Dictation: Part of this note may be an electronic transcription/translation of spoken language to printed text using the Dragon Dictation System.    Carine Talbot D.O.  [facility]

## 2024-06-21 ENCOUNTER — HOSPITAL ENCOUNTER (EMERGENCY)
Facility: HOSPITAL | Age: 29
Discharge: HOME | End: 2024-06-21
Attending: EMERGENCY MEDICINE
Payer: MEDICAID

## 2024-06-21 VITALS
TEMPERATURE: 97.8 F | WEIGHT: 135 LBS | OXYGEN SATURATION: 100 % | HEART RATE: 60 BPM | RESPIRATION RATE: 20 BRPM | BODY MASS INDEX: 21.19 KG/M2 | SYSTOLIC BLOOD PRESSURE: 123 MMHG | DIASTOLIC BLOOD PRESSURE: 87 MMHG | HEIGHT: 67 IN

## 2024-06-21 DIAGNOSIS — R11.2 NAUSEA AND VOMITING, UNSPECIFIED VOMITING TYPE: Primary | ICD-10-CM

## 2024-06-21 LAB
ALBUMIN SERPL-MCNC: 4.6 G/DL (ref 3.5–5.2)
ALBUMIN/GLOB SERPL: 1.8 G/DL
ALP SERPL-CCNC: 55 U/L (ref 39–117)
ALT SERPL W P-5'-P-CCNC: 6 U/L (ref 1–41)
ANION GAP SERPL CALCULATED.3IONS-SCNC: 18.1 MMOL/L (ref 5–15)
AST SERPL-CCNC: 21 U/L (ref 1–40)
BASOPHILS # BLD AUTO: 0.05 10*3/MM3 (ref 0–0.2)
BASOPHILS NFR BLD AUTO: 0.5 % (ref 0–1.5)
BILIRUB SERPL-MCNC: 0.6 MG/DL (ref 0–1.2)
BILIRUB UR QL STRIP: NEGATIVE
BUN SERPL-MCNC: 11 MG/DL (ref 6–20)
BUN/CREAT SERPL: 13.9 (ref 7–25)
CALCIUM SPEC-SCNC: 9.5 MG/DL (ref 8.6–10.5)
CHLORIDE SERPL-SCNC: 105 MMOL/L (ref 98–107)
CLARITY UR: CLEAR
CO2 SERPL-SCNC: 21.9 MMOL/L (ref 22–29)
COLOR UR: YELLOW
CREAT SERPL-MCNC: 0.79 MG/DL (ref 0.76–1.27)
DEPRECATED RDW RBC AUTO: 42.2 FL (ref 37–54)
EGFRCR SERPLBLD CKD-EPI 2021: 125.6 ML/MIN/1.73
EOSINOPHIL # BLD AUTO: 0 10*3/MM3 (ref 0–0.4)
EOSINOPHIL NFR BLD AUTO: 0 % (ref 0.3–6.2)
ERYTHROCYTE [DISTWIDTH] IN BLOOD BY AUTOMATED COUNT: 11.8 % (ref 12.3–15.4)
GLOBULIN UR ELPH-MCNC: 2.6 GM/DL
GLUCOSE SERPL-MCNC: 112 MG/DL (ref 65–99)
GLUCOSE UR STRIP-MCNC: NEGATIVE MG/DL
HCT VFR BLD AUTO: 38.3 % (ref 37.5–51)
HGB BLD-MCNC: 13 G/DL (ref 13–17.7)
HGB UR QL STRIP.AUTO: NEGATIVE
IMM GRANULOCYTES # BLD AUTO: 0.05 10*3/MM3 (ref 0–0.05)
IMM GRANULOCYTES NFR BLD AUTO: 0.5 % (ref 0–0.5)
KETONES UR QL STRIP: ABNORMAL
LEUKOCYTE ESTERASE UR QL STRIP.AUTO: NEGATIVE
LIPASE SERPL-CCNC: 9 U/L (ref 13–60)
LYMPHOCYTES # BLD AUTO: 0.58 10*3/MM3 (ref 0.7–3.1)
LYMPHOCYTES NFR BLD AUTO: 5.7 % (ref 19.6–45.3)
MCH RBC QN AUTO: 32 PG (ref 26.6–33)
MCHC RBC AUTO-ENTMCNC: 33.9 G/DL (ref 31.5–35.7)
MCV RBC AUTO: 94.3 FL (ref 79–97)
MONOCYTES # BLD AUTO: 0.66 10*3/MM3 (ref 0.1–0.9)
MONOCYTES NFR BLD AUTO: 6.5 % (ref 5–12)
NEUTROPHILS NFR BLD AUTO: 8.81 10*3/MM3 (ref 1.7–7)
NEUTROPHILS NFR BLD AUTO: 86.8 % (ref 42.7–76)
NITRITE UR QL STRIP: NEGATIVE
PH UR STRIP.AUTO: 8 [PH] (ref 5–8)
PLATELET # BLD AUTO: 207 10*3/MM3 (ref 140–450)
PMV BLD AUTO: 11 FL (ref 6–12)
POTASSIUM SERPL-SCNC: 3.2 MMOL/L (ref 3.5–5.2)
PROT SERPL-MCNC: 7.2 G/DL (ref 6–8.5)
PROT UR QL STRIP: NEGATIVE
RBC # BLD AUTO: 4.06 10*6/MM3 (ref 4.14–5.8)
SODIUM SERPL-SCNC: 145 MMOL/L (ref 136–145)
SP GR UR STRIP: 1.02 (ref 1–1.03)
UROBILINOGEN UR QL STRIP: ABNORMAL
WBC NRBC COR # BLD AUTO: 10.15 10*3/MM3 (ref 3.4–10.8)

## 2024-06-21 PROCEDURE — 81003 URINALYSIS AUTO W/O SCOPE: CPT | Performed by: EMERGENCY MEDICINE

## 2024-06-21 PROCEDURE — 85025 COMPLETE CBC W/AUTO DIFF WBC: CPT | Performed by: EMERGENCY MEDICINE

## 2024-06-21 PROCEDURE — 25810000003 SODIUM CHLORIDE 0.9 % SOLUTION: Performed by: EMERGENCY MEDICINE

## 2024-06-21 PROCEDURE — 99283 EMERGENCY DEPT VISIT LOW MDM: CPT

## 2024-06-21 PROCEDURE — 83690 ASSAY OF LIPASE: CPT | Performed by: EMERGENCY MEDICINE

## 2024-06-21 PROCEDURE — 80053 COMPREHEN METABOLIC PANEL: CPT | Performed by: EMERGENCY MEDICINE

## 2024-06-21 RX ORDER — PROMETHAZINE HYDROCHLORIDE 25 MG/1
25 TABLET ORAL EVERY 6 HOURS PRN
Qty: 12 TABLET | Refills: 0 | Status: SHIPPED | OUTPATIENT
Start: 2024-06-21 | End: 2024-06-24 | Stop reason: SDUPTHER

## 2024-06-21 RX ORDER — SODIUM CHLORIDE 0.9 % (FLUSH) 0.9 %
10 SYRINGE (ML) INJECTION AS NEEDED
Status: DISCONTINUED | OUTPATIENT
Start: 2024-06-21 | End: 2024-06-21 | Stop reason: HOSPADM

## 2024-06-21 RX ORDER — METOCLOPRAMIDE 10 MG/1
10 TABLET ORAL ONCE
Status: COMPLETED | OUTPATIENT
Start: 2024-06-21 | End: 2024-06-21

## 2024-06-21 RX ORDER — METOCLOPRAMIDE 5 MG/1
10 TABLET ORAL 3 TIMES DAILY PRN
Qty: 12 TABLET | Refills: 0 | Status: SHIPPED | OUTPATIENT
Start: 2024-06-21 | End: 2024-06-24

## 2024-06-21 RX ORDER — ONDANSETRON 2 MG/ML
4 INJECTION INTRAMUSCULAR; INTRAVENOUS ONCE
Status: DISCONTINUED | OUTPATIENT
Start: 2024-06-21 | End: 2024-06-21

## 2024-06-21 RX ADMIN — SODIUM CHLORIDE 1000 ML: 9 INJECTION, SOLUTION INTRAVENOUS at 04:27

## 2024-06-21 RX ADMIN — METOCLOPRAMIDE 10 MG: 10 TABLET ORAL at 04:37

## 2024-06-21 NOTE — FSED PROVIDER NOTE
Subjective   History of Present Illness  Patient presents to the emergency department for nausea vomiting and diarrhea.  Symptoms began earlier tonight.  He says he may have eaten a bad burrito at [facility].  He denies any blood in his vomit or stool.  Notes abdominal cramping.  He denies any fever.  No known sick contacts.  Has not taken anything at home.  Denies any alleviating or exacerbating factors    History provided by:  Patient   used: No        Review of Systems   Gastrointestinal:  Positive for diarrhea, nausea and vomiting.   All other systems reviewed and are negative.      Past Medical History:   Diagnosis Date    Anxiety     Chlamydia        No Known Allergies    History reviewed. No pertinent surgical history.    Family History   Problem Relation Age of Onset    Hypertension Mother     Lupus Mother     No Known Problems Father     Hypertension Sister     Asthma Brother     Allergies Brother     Eczema Brother        Social History     Socioeconomic History    Marital status: Single   Tobacco Use    Smoking status: Never     Passive exposure: Never    Smokeless tobacco: Never   Vaping Use    Vaping status: Some Days    Substances: Nicotine    Devices: Disposable   Substance and Sexual Activity    Alcohol use: Yes     Alcohol/week: 5.0 standard drinks of alcohol     Types: 3 Cans of beer, 2 Shots of liquor per week     Comment: social    Drug use: Yes     Types: Marijuana    Sexual activity: Defer           Objective   Physical Exam  Vitals and nursing note reviewed.   Constitutional:       Appearance: He is well-developed.      Comments: Patient sleeping upon initial evaluation easily arousable   HENT:      Head: Normocephalic and atraumatic.   Eyes:      Extraocular Movements: Extraocular movements intact.      Pupils: Pupils are equal, round, and reactive to light.   Cardiovascular:      Rate and Rhythm: Normal rate and regular rhythm.      Heart sounds: Normal heart sounds.    Pulmonary:      Effort: Pulmonary effort is normal.      Breath sounds: Normal breath sounds.   Abdominal:      General: Abdomen is flat. There are no signs of injury.      Palpations: Abdomen is soft.      Tenderness: There is abdominal tenderness (Mild) in the epigastric area. There is no guarding or rebound.      Hernia: No hernia is present.   Skin:     General: Skin is warm and dry.   Neurological:      General: No focal deficit present.      Mental Status: He is oriented to person, place, and time.      Cranial Nerves: No cranial nerve deficit.   Psychiatric:         Mood and Affect: Mood normal.         Behavior: Behavior normal.         Procedures           ED Course                                           Medical Decision Making  Hemodynamically stable and afebrile.  Patient has no elevation in his white count.  No peritoneal signs.  Does have mild anion gap given fluids.  He was also given antiemetics and is feeling somewhat better.  Discussed CT scan with the patient he is comfortable foregoing at this time for observation and return precautions.  Will give antiemetics for home the patient declines the Zofran.  Discharge    Problems Addressed:  Nausea and vomiting, unspecified vomiting type: complicated acute illness or injury    Amount and/or Complexity of Data Reviewed  Labs: ordered. Decision-making details documented in ED Course.    Risk  Prescription drug management.        Final diagnoses:   Nausea and vomiting, unspecified vomiting type       ED Disposition  ED Disposition       ED Disposition   Discharge    Condition   Stable    Comment   --               Carine Talbot DO  [address]  [place] [address]  [phone]    Schedule an appointment as soon as possible for a visit   As needed    [facility] EM[facility]PART[facility] [place]  [address]  [facility] [address] [phone]  Go to   If symptoms worsen         Medication List         New Prescriptions      metoclopramide 5 MG tablet  Commonly known as: REGLAN  Take 2 tablets by mouth 3 (Three) Times a Day As Needed (Vomiting).     promethazine 25 MG tablet  Commonly known as: PHENERGAN  Take 1 tablet by mouth Every 6 (Six) Hours As Needed for Nausea or Vomiting.               Where to Get Your Medications        These medications were sent to [facility] - [place], [place] - [id number] - [phone] [facility] [phone] FX  [id number], [facility] [address]      Phone: [phone]   metoclopramide 5 MG tablet  promethazine 25 MG tablet

## 2024-06-24 ENCOUNTER — OFFICE VISIT (OUTPATIENT)
Dept: FAMILY MEDICINE CLINIC | Facility: CLINIC | Age: 29
End: 2024-06-24
Payer: MEDICAID

## 2024-06-24 VITALS
BODY MASS INDEX: 20.15 KG/M2 | SYSTOLIC BLOOD PRESSURE: 118 MMHG | HEART RATE: 80 BPM | TEMPERATURE: 98 F | WEIGHT: 128.4 LBS | DIASTOLIC BLOOD PRESSURE: 70 MMHG | HEIGHT: 67 IN

## 2024-06-24 DIAGNOSIS — F41.1 GAD (GENERALIZED ANXIETY DISORDER): ICD-10-CM

## 2024-06-24 DIAGNOSIS — K58.2 IRRITABLE BOWEL SYNDROME WITH BOTH CONSTIPATION AND DIARRHEA: Primary | ICD-10-CM

## 2024-06-24 PROCEDURE — 1160F RVW MEDS BY RX/DR IN RCRD: CPT | Performed by: FAMILY MEDICINE

## 2024-06-24 PROCEDURE — 1126F AMNT PAIN NOTED NONE PRSNT: CPT | Performed by: FAMILY MEDICINE

## 2024-06-24 PROCEDURE — 1159F MED LIST DOCD IN RCRD: CPT | Performed by: FAMILY MEDICINE

## 2024-06-24 PROCEDURE — 99214 OFFICE O/P EST MOD 30 MIN: CPT | Performed by: FAMILY MEDICINE

## 2024-06-24 RX ORDER — DICYCLOMINE HYDROCHLORIDE 10 MG/1
10 CAPSULE ORAL
Qty: 120 CAPSULE | Refills: 0 | Status: SHIPPED | OUTPATIENT
Start: 2024-06-24

## 2024-06-24 RX ORDER — PROMETHAZINE HYDROCHLORIDE 25 MG/1
25 TABLET ORAL EVERY 6 HOURS PRN
Qty: 12 TABLET | Refills: 0 | Status: SHIPPED | OUTPATIENT
Start: 2024-06-24

## 2024-06-24 NOTE — PROGRESS NOTES
Subjective     Chief Complaint  Vomiting (Was seen in the ED over the weekend)    Subjective          Willy Vu is a 28 y.o. male who presents today to [facility] for initial evaluation .    HPI:   Vomiting         TreMichael is a very pleasant 28-year-old male presents today to follow-up on his chronic medical conditions.    His anxiety has been moderately controlled with changes in his work and some lifestyle changes.  He is no longer taking BuSpar and is doing okay without it.  He did follow with a counselor 1 time and has been hesitant to follow with therapy but is interested in it.    He has a history of irritable bowel syndrome with both constipation and diarrhea.  He does exhibit some symptoms of Crohn's with having significant cramping and will have symptoms after eating certain foods.  Most recently it happened after eating [employer].    The following portions of the patient's history were reviewed and updated as appropriate: allergies, current medications, past family history, past medical history, past social history, past surgical history and problem list.    Objective     Objective     Allergy:   No Known Allergies     Current Medications:   Current Outpatient Medications   Medication Sig Dispense Refill    promethazine (PHENERGAN) 25 MG tablet Take 1 tablet by mouth Every 6 (Six) Hours As Needed for Nausea or Vomiting. 12 tablet 0    valACYclovir (Valtrex) 1000 MG tablet Take 2 tablets by mouth BID for one days as needed for a outbreak, may repeat if needed 30 tablet 5    dicyclomine (BENTYL) 10 MG capsule Take 1 capsule by mouth 4 (Four) Times a Day Before Meals & at Bedtime. As needed for cramping 120 capsule 0     No current facility-administered medications for this visit.       Past Medical History:  Past Medical History:   Diagnosis Date    Anxiety     Chlamydia        Past Surgical History:  History reviewed. No pertinent surgical  "history.    Social History:  Social History     Socioeconomic History    Marital status: Single   Tobacco Use    Smoking status: Never     Passive exposure: Never    Smokeless tobacco: Never   Vaping Use    Vaping status: Some Days    Substances: Nicotine    Devices: Disposable   Substance and Sexual Activity    Alcohol use: Yes     Alcohol/week: 5.0 standard drinks of alcohol     Types: 3 Cans of beer, 2 Shots of liquor per week     Comment: social    Drug use: Yes     Types: Marijuana    Sexual activity: Defer       Family History:  Family History   Problem Relation Age of Onset    Hypertension Mother     Lupus Mother     No Known Problems Father     Hypertension Sister     Asthma Brother     Allergies Brother     Eczema Brother        Vital Signs:   /70   Pulse 80   Temp 98 °F (36.7 °C) (Infrared)   Ht 170.2 cm (67.01\")   Wt 58.2 kg (128 lb 6.4 oz)   BMI 20.11 kg/m²      Physical Exam:  Physical Exam  Vitals reviewed.   Constitutional:       Appearance: He is not ill-appearing.   Eyes:      Pupils: Pupils are equal, round, and reactive to light.   Cardiovascular:      Rate and Rhythm: Normal rate.      Pulses: Normal pulses.   Pulmonary:      Effort: Pulmonary effort is normal.      Breath sounds: Normal breath sounds.   Neurological:      General: No focal deficit present.      Mental Status: He is alert. Mental status is at baseline.   Psychiatric:         Mood and Affect: Mood normal.         Behavior: Behavior normal.         Thought Content: Thought content normal.         Judgment: Judgment normal.               PHQ-9 Score  PHQ-9 Total Score:       Lab Review  Admission on 06/21/2024, Discharged on 06/21/2024   Component Date Value Ref Range Status    Glucose 06/21/2024 112 (H)  65 - 99 mg/dL Final    BUN 06/21/2024 11  6 - 20 mg/dL Final    Creatinine 06/21/2024 0.79  0.76 - 1.27 mg/dL Final    Sodium 06/21/2024 145  136 - 145 mmol/L Final    Potassium 06/21/2024 3.2 (L)  3.5 - 5.2 mmol/L Final "    Chloride 06/21/2024 105  98 - 107 mmol/L Final    CO2 06/21/2024 21.9 (L)  22.0 - 29.0 mmol/L Final    Calcium 06/21/2024 9.5  8.6 - 10.5 mg/dL Final    Total Protein 06/21/2024 7.2  6.0 - 8.5 g/dL Final    Albumin 06/21/2024 4.6  3.5 - 5.2 g/dL Final    ALT (SGPT) 06/21/2024 6  1 - 41 U/L Final    AST (SGOT) 06/21/2024 21  1 - 40 U/L Final    Alkaline Phosphatase 06/21/2024 55  39 - 117 U/L Final    Total Bilirubin 06/21/2024 0.6  0.0 - 1.2 mg/dL Final    Globulin 06/21/2024 2.6  gm/dL Final    A/G Ratio 06/21/2024 1.8  g/dL Final    BUN/Creatinine Ratio 06/21/2024 13.9  7.0 - 25.0 Final    Anion Gap 06/21/2024 18.1 (H)  5.0 - 15.0 mmol/L Final    eGFR 06/21/2024 125.6  >60.0 mL/min/1.73 Final    Lipase 06/21/2024 9 (L)  13 - 60 U/L Final    Color, UA 06/21/2024 Yellow  Yellow, Straw Final    Appearance, UA 06/21/2024 Clear  Clear Final    pH, UA 06/21/2024 8.0  5.0 - 8.0 Final    Specific Gravity, UA 06/21/2024 1.020  1.005 - 1.030 Final    Glucose, UA 06/21/2024 Negative  Negative Final    Ketones, UA 06/21/2024 40 mg/dL (2+) (A)  Negative Final    Bilirubin, UA 06/21/2024 Negative  Negative Final    Blood, UA 06/21/2024 Negative  Negative Final    Protein, UA 06/21/2024 Negative  Negative Final    Leuk Esterase, UA 06/21/2024 Negative  Negative Final    Nitrite, UA 06/21/2024 Negative  Negative Final    Urobilinogen, UA 06/21/2024 1.0 E.U./dL  0.2 - 1.0 E.U./dL Final    WBC 06/21/2024 10.15  3.40 - 10.80 10*3/mm3 Final    RBC 06/21/2024 4.06 (L)  4.14 - 5.80 10*6/mm3 Final    Hemoglobin 06/21/2024 13.0  13.0 - 17.7 g/dL Final    Hematocrit 06/21/2024 38.3  37.5 - 51.0 % Final    MCV 06/21/2024 94.3  79.0 - 97.0 fL Final    MCH 06/21/2024 32.0  26.6 - 33.0 pg Final    MCHC 06/21/2024 33.9  31.5 - 35.7 g/dL Final    RDW 06/21/2024 11.8 (L)  12.3 - 15.4 % Final    RDW-SD 06/21/2024 42.2  37.0 - 54.0 fl Final    MPV 06/21/2024 11.0  6.0 - 12.0 fL Final    Platelets 06/21/2024 207  140 - 450 10*3/mm3 Final     Neutrophil % 06/21/2024 86.8 (H)  42.7 - 76.0 % Final    Lymphocyte % 06/21/2024 5.7 (L)  19.6 - 45.3 % Final    Monocyte % 06/21/2024 6.5  5.0 - 12.0 % Final    Eosinophil % 06/21/2024 0.0 (L)  0.3 - 6.2 % Final    Basophil % 06/21/2024 0.5  0.0 - 1.5 % Final    Immature Grans % 06/21/2024 0.5  0.0 - 0.5 % Final    Neutrophils, Absolute 06/21/2024 8.81 (H)  1.70 - 7.00 10*3/mm3 Final    Lymphocytes, Absolute 06/21/2024 0.58 (L)  0.70 - 3.10 10*3/mm3 Final    Monocytes, Absolute 06/21/2024 0.66  0.10 - 0.90 10*3/mm3 Final    Eosinophils, Absolute 06/21/2024 0.00  0.00 - 0.40 10*3/mm3 Final    Basophils, Absolute 06/21/2024 0.05  0.00 - 0.20 10*3/mm3 Final    Immature Grans, Absolute 06/21/2024 0.05  0.00 - 0.05 10*3/mm3 Final        Radiology Results        Assessment / Plan         Assessment and Plan   Diagnoses and all orders for this visit:    1. Irritable bowel syndrome with both constipation and diarrhea (Primary)  Assessment & Plan:  Referring patient for colonoscopy with GI.  Also prescribing Bentyl to take as needed for cramping.    Orders:  -     Ambulatory Referral to Gastroenterology  -     dicyclomine (BENTYL) 10 MG capsule; Take 1 capsule by mouth 4 (Four) Times a Day Before Meals & at Bedtime. As needed for cramping  Dispense: 120 capsule; Refill: 0  -     promethazine (PHENERGAN) 25 MG tablet; Take 1 tablet by mouth Every 6 (Six) Hours As Needed for Nausea or Vomiting.  Dispense: 12 tablet; Refill: 0    2. CURTIS (generalized anxiety disorder)  Assessment & Plan:  Improving, no longer taking BuSpar.  Encouraged to follow-up with counseling.              Discussed possible differential diagnoses, testing, treatment, recommended non-pharmacological interventions, risks, warning signs to monitor for that would indicate need for follow-up in clinic or ER. If no improvement with these regimens or you have new or worsening symptoms follow-up. Patient verbalizes understanding and agreement with plan of care.  Denies further needs or concerns.     Patient was given instructions and counseling regarding her condition and for health maintenance advised.    BMI is within normal parameters. No other follow-up for BMI required.       Health Maintenance  Health Maintenance: There are no preventive care reminders to display for this patient.     Meds ordered during this visit  New Medications Ordered This Visit   Medications    dicyclomine (BENTYL) 10 MG capsule     Sig: Take 1 capsule by mouth 4 (Four) Times a Day Before Meals & at Bedtime. As needed for cramping     Dispense:  120 capsule     Refill:  0    promethazine (PHENERGAN) 25 MG tablet     Sig: Take 1 tablet by mouth Every 6 (Six) Hours As Needed for Nausea or Vomiting.     Dispense:  12 tablet     Refill:  0       Meds stopped during this visit:  Medications Discontinued During This Encounter   Medication Reason    busPIRone (BUSPAR) 7.5 MG tablet     metoclopramide (REGLAN) 5 MG tablet     promethazine (PHENERGAN) 25 MG tablet Reorder        Visit Diagnoses    ICD-10-CM ICD-9-CM   1. Irritable bowel syndrome with both constipation and diarrhea  K58.2 564.1   2. CURTIS (generalized anxiety disorder)  F41.1 300.02         Patient was given instructions and counseling regarding his condition or for health maintenance advice. Please see specific information pulled into the AVS if appropriate.     Follow Up   Return in about 6 weeks (around 8/5/2024) for followup.          This document has been electronically signed by Carine Talbot DO   June 24, 2024 13:42 EDT    Dictated Utilizing Dragon Dictation: Part of this note may be an electronic transcription/translation of spoken language to printed text using the Dragon Dictation System.    Carine Talbot D.O.  [facility] [place]

## 2024-06-24 NOTE — ASSESSMENT & PLAN NOTE
Referring patient for colonoscopy with GI.  Also prescribing Bentyl to take as needed for cramping.

## 2024-10-21 ENCOUNTER — OFFICE VISIT (OUTPATIENT)
Dept: FAMILY MEDICINE CLINIC | Facility: CLINIC | Age: 29
End: 2024-10-21
Payer: MEDICAID

## 2024-10-21 VITALS
HEIGHT: 67 IN | SYSTOLIC BLOOD PRESSURE: 120 MMHG | HEART RATE: 87 BPM | WEIGHT: 127.4 LBS | TEMPERATURE: 98 F | OXYGEN SATURATION: 97 % | RESPIRATION RATE: 18 BRPM | BODY MASS INDEX: 20 KG/M2 | DIASTOLIC BLOOD PRESSURE: 60 MMHG

## 2024-10-21 DIAGNOSIS — J02.9 SORE THROAT: ICD-10-CM

## 2024-10-21 DIAGNOSIS — J01.00 ACUTE NON-RECURRENT MAXILLARY SINUSITIS: Primary | ICD-10-CM

## 2024-10-21 DIAGNOSIS — R05.9 COUGH, UNSPECIFIED TYPE: ICD-10-CM

## 2024-10-21 LAB
EXPIRATION DATE: NORMAL
FLUAV AG NPH QL: NEGATIVE
FLUBV AG NPH QL: NEGATIVE
INTERNAL CONTROL: NORMAL
Lab: NORMAL
S PYO AG THROAT QL: NEGATIVE
SARS-COV-2 AG UPPER RESP QL IA.RAPID: NOT DETECTED

## 2024-10-21 PROCEDURE — 1159F MED LIST DOCD IN RCRD: CPT | Performed by: FAMILY MEDICINE

## 2024-10-21 PROCEDURE — 99213 OFFICE O/P EST LOW 20 MIN: CPT | Performed by: FAMILY MEDICINE

## 2024-10-21 PROCEDURE — 87880 STREP A ASSAY W/OPTIC: CPT | Performed by: FAMILY MEDICINE

## 2024-10-21 PROCEDURE — 87426 SARSCOV CORONAVIRUS AG IA: CPT | Performed by: FAMILY MEDICINE

## 2024-10-21 PROCEDURE — 87804 INFLUENZA ASSAY W/OPTIC: CPT | Performed by: FAMILY MEDICINE

## 2024-10-21 PROCEDURE — 1160F RVW MEDS BY RX/DR IN RCRD: CPT | Performed by: FAMILY MEDICINE

## 2024-10-21 PROCEDURE — 1125F AMNT PAIN NOTED PAIN PRSNT: CPT | Performed by: FAMILY MEDICINE

## 2024-10-21 RX ORDER — BENZONATATE 100 MG/1
100 CAPSULE ORAL 3 TIMES DAILY PRN
Qty: 30 CAPSULE | Refills: 0 | Status: SHIPPED | OUTPATIENT
Start: 2024-10-21

## 2024-10-21 RX ORDER — METHYLPREDNISOLONE 4 MG
TABLET, DOSE PACK ORAL
Qty: 21 TABLET | Refills: 0 | Status: SHIPPED | OUTPATIENT
Start: 2024-10-21

## 2024-10-21 NOTE — PROGRESS NOTES
"Chief Complaint  Cough, Headache, Nasal Congestion, and Sore Throat (Couple days ago.)    Subjective        Willy Vu presents to [facility]  Cough  This is a new problem. The current episode started in the past 7 days. The problem has been worse. The problem occurs constantly. The cough is Productive of brown sputum. Associated symptoms include chills, headaches, nasal congestion, postnasal drip, rhinorrhea, a sore throat and shortness of breath. Pertinent negatives include no fever or sweats. He has tried rest and OTC cough suppressant for the symptoms. The treatment provided mild relief.   Headache  Sore Throat   Associated symptoms include coughing, headaches and shortness of breath.       Objective   Vital Signs:  /60   Pulse 87   Temp 98 °F (36.7 °C) (Temporal)   Resp 18   Ht 170.2 cm (67.01\")   Wt 57.8 kg (127 lb 6.4 oz)   SpO2 97%   BMI 19.95 kg/m²   Estimated body mass index is 19.95 kg/m² as calculated from the following:    Height as of this encounter: 170.2 cm (67.01\").    Weight as of this encounter: 57.8 kg (127 lb 6.4 oz).    BMI is within normal parameters. No other follow-up for BMI required.    Review of Systems   Constitutional:  Positive for chills. Negative for fever.   HENT:  Positive for postnasal drip, rhinorrhea and sore throat.    Respiratory:  Positive for cough and shortness of breath.    Neurological:  Positive for headaches.       Physical Exam  Vitals reviewed.   Constitutional:       Appearance: Normal appearance.   HENT:      Right Ear: A middle ear effusion is present.      Left Ear: A middle ear effusion is present.      Nose: Congestion and rhinorrhea present. Rhinorrhea is purulent.      Right Turbinates: Swollen.      Left Turbinates: Swollen.      Right Sinus: Maxillary sinus tenderness present.      Left Sinus: Maxillary sinus tenderness present.      Mouth/Throat:      Pharynx: Postnasal drip present. "   Neurological:      Mental Status: He is alert.        Result Review :         Assessment and Plan   Diagnoses and all orders for this visit:    1. Acute non-recurrent maxillary sinusitis (Primary)  Assessment & Plan:  Likely initially a viral URI with secondary bacterial infection  Due to signs and symptoms and duration of illness will start antibiotics.  Rx for Augmentin x 7  days sent to pharmacy  Medrol dose lory sent as well for swelling etc, tessalon pearls for cough.   Discussed use of saline nasal rinses and medications as prescribed  Continue allergy medications, Tylenol/ibuprofen as needed.  If symptoms do not improve patient to return to clinic for reevaluation      Orders:  -     methylPREDNISolone (MEDROL) 4 MG dose pack; Take as directed on package instructions.  Dispense: 21 tablet; Refill: 0  -     amoxicillin-clavulanate (AUGMENTIN) 875-125 MG per tablet; Take 1 tablet by mouth 2 (Two) Times a Day.  Dispense: 14 tablet; Refill: 0  -     benzonatate (Tessalon Perles) 100 MG capsule; Take 1 capsule by mouth 3 (Three) Times a Day As Needed for Cough.  Dispense: 30 capsule; Refill: 0    2. Sore throat  -     POC Rapid Strep A    3. Cough, unspecified type  -     POC Influenza A / B  -     POCT SARS-CoV-2 Antigen             Follow Up   Return for Next scheduled follow up.  Patient was given instructions and counseling regarding his condition or for health maintenance advice. Please see specific information pulled into the AVS if appropriate.         This document has been electronically signed by Carine Talbto DO   October 21, 2024 11:27 EDT    Dictated Utilizing Dragon Dictation: Part of this note may be an electronic transcription/translation of spoken language to printed text using the Dragon Dictation System.    Carine Talbot D.O.  [facility] [place]

## 2024-10-21 NOTE — ASSESSMENT & PLAN NOTE
Likely initially a viral URI with secondary bacterial infection  Due to signs and symptoms and duration of illness will start antibiotics.  Rx for Augmentin x 7  days sent to pharmacy  Medrol dose lory sent as well for swelling etc, tessalon pearls for cough.   Discussed use of saline nasal rinses and medications as prescribed  Continue allergy medications, Tylenol/ibuprofen as needed.  If symptoms do not improve patient to return to clinic for reevaluation

## 2024-10-21 NOTE — LETTER
October 21, 2024     Patient: Willy Vu   YOB: 1995   Date of Visit: 10/21/2024       To Whom It May Concern:    It is my medical opinion that Willy Vu may return to work in one day.            Sincerely,        Carine Talbot DO    CC: No Recipients

## 2024-10-22 ENCOUNTER — TELEPHONE (OUTPATIENT)
Dept: FAMILY MEDICINE CLINIC | Facility: CLINIC | Age: 29
End: 2024-10-22
Payer: MEDICAID

## 2024-10-22 NOTE — TELEPHONE ENCOUNTER
Patient forgot to get a work note when he was here yesterday and he needs one for today can we place that in his mychart if possible?

## 2024-10-22 NOTE — LETTER
October 22, 2024     Patient: Willy Vu   YOB: 1995   Date of Visit: 10/21/2024       To Whom It May Concern:    It is my medical opinion that Willy Vu may return to work on 10/23/2024 .           Sincerely,        Carine Talbot DO    CC: No Recipients

## 2024-10-29 ENCOUNTER — TELEPHONE (OUTPATIENT)
Dept: FAMILY MEDICINE CLINIC | Facility: CLINIC | Age: 29
End: 2024-10-29
Payer: MEDICAID

## 2024-10-29 NOTE — TELEPHONE ENCOUNTER
HUB TO READ      LM for pt to let him know that he does not need an appointment for a stitch removal. He can pop in anytime during normal business hours.

## 2024-12-04 ENCOUNTER — HOSPITAL ENCOUNTER (EMERGENCY)
Facility: HOSPITAL | Age: 29
Discharge: HOME | End: 2024-12-04
Attending: EMERGENCY MEDICINE | Admitting: EMERGENCY MEDICINE
Payer: MEDICAID

## 2024-12-04 VITALS
SYSTOLIC BLOOD PRESSURE: 138 MMHG | DIASTOLIC BLOOD PRESSURE: 79 MMHG | WEIGHT: 120 LBS | HEART RATE: 65 BPM | BODY MASS INDEX: 18.83 KG/M2 | HEIGHT: 67 IN | RESPIRATION RATE: 18 BRPM | TEMPERATURE: 98.1 F | OXYGEN SATURATION: 97 %

## 2024-12-04 DIAGNOSIS — K08.89 PAIN, DENTAL: Primary | ICD-10-CM

## 2024-12-04 PROCEDURE — 25010000002 KETOROLAC TROMETHAMINE PER 15 MG: Performed by: EMERGENCY MEDICINE

## 2024-12-04 PROCEDURE — 96372 THER/PROPH/DIAG INJ SC/IM: CPT

## 2024-12-04 PROCEDURE — 99283 EMERGENCY DEPT VISIT LOW MDM: CPT

## 2024-12-04 RX ORDER — PENICILLIN V POTASSIUM 250 MG/1
500 TABLET, FILM COATED ORAL ONCE
Status: DISCONTINUED | OUTPATIENT
Start: 2024-12-04 | End: 2024-12-04 | Stop reason: RX

## 2024-12-04 RX ORDER — PENICILLIN V POTASSIUM 500 MG/1
500 TABLET, FILM COATED ORAL 4 TIMES DAILY
Qty: 40 TABLET | Refills: 0 | Status: SHIPPED | OUTPATIENT
Start: 2024-12-04 | End: 2024-12-14

## 2024-12-04 RX ORDER — KETOROLAC TROMETHAMINE 10 MG/1
10 TABLET, FILM COATED ORAL EVERY 6 HOURS PRN
Qty: 12 TABLET | Refills: 0 | Status: SHIPPED | OUTPATIENT
Start: 2024-12-04

## 2024-12-04 RX ORDER — KETOROLAC TROMETHAMINE 30 MG/ML
30 INJECTION, SOLUTION INTRAMUSCULAR; INTRAVENOUS ONCE
Status: COMPLETED | OUTPATIENT
Start: 2024-12-04 | End: 2024-12-04

## 2024-12-04 RX ADMIN — KETOROLAC TROMETHAMINE 30 MG: 30 INJECTION, SOLUTION INTRAMUSCULAR; INTRAVENOUS at 02:18

## 2024-12-04 RX ADMIN — AMOXICILLIN AND CLAVULANATE POTASSIUM 1 TABLET: 875; 125 TABLET, FILM COATED ORAL at 02:17

## 2024-12-04 NOTE — FSED PROVIDER NOTE
Subjective   History of Present Illness  Patient presents to the emergency department for dental pain.  Says starting on Sunday he has had pain in the left upper molar.  He denies any drainage no facial swelling.  He denies any trauma.  Says it has just been throbbing.  Has tried to call his dentist but has been unable to get in.  Denies any change in voice neck pain or other symptoms    History provided by:  Patient   used: No        Review of Systems   HENT:  Positive for dental problem.    All other systems reviewed and are negative.      Past Medical History:   Diagnosis Date    Anxiety     Chlamydia        No Known Allergies    No past surgical history on file.    Family History   Problem Relation Age of Onset    Hypertension Mother     Lupus Mother     No Known Problems Father     Hypertension Sister     Asthma Brother     Allergies Brother     Eczema Brother        Social History     Socioeconomic History    Marital status: Single   Tobacco Use    Smoking status: Never     Passive exposure: Never    Smokeless tobacco: Never   Vaping Use    Vaping status: Some Days    Substances: Nicotine    Devices: Disposable   Substance and Sexual Activity    Alcohol use: Yes     Alcohol/week: 5.0 standard drinks of alcohol     Types: 3 Cans of beer, 2 Shots of liquor per week     Comment: social    Drug use: Yes     Types: Marijuana    Sexual activity: Defer           Objective   Physical Exam  Vitals and nursing note reviewed.   Constitutional:       Appearance: Normal appearance.   HENT:      Head: Normocephalic and atraumatic.      Nose: Nose normal. No congestion.      Mouth/Throat:      Mouth: Mucous membranes are moist.      Dentition: Does not have dentures. Dental caries present. No dental abscesses or gum lesions.      Tongue: No lesions. Tongue does not deviate from midline.      Pharynx: Oropharynx is clear.     Cardiovascular:      Rate and Rhythm: Regular rhythm.      Heart sounds: Normal  heart sounds.   Pulmonary:      Effort: Pulmonary effort is normal. No respiratory distress.   Abdominal:      Palpations: Abdomen is soft.   Musculoskeletal:         General: No swelling or tenderness. Normal range of motion.      Cervical back: Normal range of motion and neck supple. No rigidity.   Lymphadenopathy:      Cervical: No cervical adenopathy.   Skin:     General: Skin is warm and dry.      Capillary Refill: Capillary refill takes less than 2 seconds.   Neurological:      General: No focal deficit present.      Mental Status: He is alert and oriented to person, place, and time.      Cranial Nerves: No cranial nerve deficit.   Psychiatric:         Mood and Affect: Mood normal.         Behavior: Behavior normal.         Procedures           ED Course                                           Medical Decision Making  Hemodynamically stable and afebrile.  Patient is isolated dental pain.  No evidence of abscess amenable to drainage.  He will need follow-up with dentistry.  Will give Toradol as well as antibiotics.  First dose given here.    Risk  Prescription drug management.        Final diagnoses:   Pain, dental       ED Disposition  ED Disposition       ED Disposition   Discharge    Condition   Stable    Comment   --               Carine Talbot DO  [address] [facility] [address]  [place] [id number]  [phone]    Schedule an appointment as soon as possible for a visit   As needed    [facility] EMERGENCY DEPARTMENT [place]  [address] [facility] [facility] [address]  [facility] [id number] [phone]  Go to   As needed    Dentistry    Schedule an appointment as soon as possible for a visit   For wound re-check         Medication List        New Prescriptions      ketorolac 10 MG tablet  Commonly known as: TORADOL  Take 1 tablet by mouth Every 6 (Six) Hours As Needed for Moderate Pain.     penicillin v potassium 500 MG tablet  Commonly known as: VEETID  Take 1 tablet by mouth 4 (Four) Times a  Day for 10 days.               Where to Get Your Medications        These medications were sent to [facility] - [place], [place] - [address] [place] [address] - [phone]  - [phone] [address] ROOM [facility], [place] [id number]      Phone: [phone]   ketorolac 10 MG tablet  penicillin v potassium 500 MG tablet

## 2025-01-31 ENCOUNTER — OFFICE VISIT (OUTPATIENT)
Dept: FAMILY MEDICINE CLINIC | Facility: CLINIC | Age: 30
End: 2025-01-31
Payer: MEDICAID

## 2025-01-31 ENCOUNTER — LAB (OUTPATIENT)
Facility: HOSPITAL | Age: 30
End: 2025-01-31
Payer: MEDICAID

## 2025-01-31 ENCOUNTER — LAB (OUTPATIENT)
Dept: LAB | Facility: HOSPITAL | Age: 30
End: 2025-01-31
Payer: MEDICAID

## 2025-01-31 VITALS
DIASTOLIC BLOOD PRESSURE: 80 MMHG | HEART RATE: 88 BPM | TEMPERATURE: 98.2 F | WEIGHT: 135 LBS | SYSTOLIC BLOOD PRESSURE: 132 MMHG | BODY MASS INDEX: 21.19 KG/M2 | HEIGHT: 67 IN

## 2025-01-31 DIAGNOSIS — Z00.00 ENCOUNTER FOR ANNUAL PHYSICAL EXAM: Primary | ICD-10-CM

## 2025-01-31 DIAGNOSIS — Z01.84 IMMUNITY STATUS TESTING: ICD-10-CM

## 2025-01-31 DIAGNOSIS — Z12.83 SKIN CANCER SCREENING: ICD-10-CM

## 2025-01-31 DIAGNOSIS — Z11.3 ROUTINE SCREENING FOR STI (SEXUALLY TRANSMITTED INFECTION): ICD-10-CM

## 2025-01-31 DIAGNOSIS — K08.89 PAIN, DENTAL: ICD-10-CM

## 2025-01-31 LAB
25(OH)D3 SERPL-MCNC: 11.8 NG/ML (ref 30–100)
ALBUMIN SERPL-MCNC: 4.5 G/DL (ref 3.5–5.2)
ALBUMIN/GLOB SERPL: 1.6 G/DL
ALP SERPL-CCNC: 72 U/L (ref 39–117)
ALT SERPL W P-5'-P-CCNC: 11 U/L (ref 1–41)
ANION GAP SERPL CALCULATED.3IONS-SCNC: 9.5 MMOL/L (ref 5–15)
AST SERPL-CCNC: 19 U/L (ref 1–40)
BASOPHILS # BLD AUTO: 0.05 10*3/MM3 (ref 0–0.2)
BASOPHILS NFR BLD AUTO: 0.8 % (ref 0–1.5)
BILIRUB SERPL-MCNC: 0.3 MG/DL (ref 0–1.2)
BUN SERPL-MCNC: 11 MG/DL (ref 6–20)
BUN/CREAT SERPL: 11.3 (ref 7–25)
CALCIUM SPEC-SCNC: 9.3 MG/DL (ref 8.6–10.5)
CHLORIDE SERPL-SCNC: 102 MMOL/L (ref 98–107)
CHOLEST SERPL-MCNC: 142 MG/DL (ref 0–200)
CO2 SERPL-SCNC: 26.5 MMOL/L (ref 22–29)
CREAT SERPL-MCNC: 0.97 MG/DL (ref 0.76–1.27)
DEPRECATED RDW RBC AUTO: 43.5 FL (ref 37–54)
EGFRCR SERPLBLD CKD-EPI 2021: 109.7 ML/MIN/1.73
EOSINOPHIL # BLD AUTO: 0.3 10*3/MM3 (ref 0–0.4)
EOSINOPHIL NFR BLD AUTO: 4.6 % (ref 0.3–6.2)
ERYTHROCYTE [DISTWIDTH] IN BLOOD BY AUTOMATED COUNT: 11.9 % (ref 12.3–15.4)
GLOBULIN UR ELPH-MCNC: 2.9 GM/DL
GLUCOSE SERPL-MCNC: 98 MG/DL (ref 65–99)
HAV IGM SERPL QL IA: NORMAL
HBA1C MFR BLD: 5 % (ref 4.8–5.6)
HBV CORE IGM SERPL QL IA: NORMAL
HBV SURFACE AB SER RIA-ACNC: NORMAL
HBV SURFACE AG SERPL QL IA: NORMAL
HCT VFR BLD AUTO: 42.8 % (ref 37.5–51)
HCV AB SER QL: NORMAL
HDLC SERPL-MCNC: 85 MG/DL (ref 40–60)
HGB BLD-MCNC: 14.1 G/DL (ref 13–17.7)
IMM GRANULOCYTES # BLD AUTO: 0.04 10*3/MM3 (ref 0–0.05)
IMM GRANULOCYTES NFR BLD AUTO: 0.6 % (ref 0–0.5)
LDLC SERPL CALC-MCNC: 45 MG/DL (ref 0–100)
LDLC/HDLC SERPL: 0.53 {RATIO}
LYMPHOCYTES # BLD AUTO: 1.5 10*3/MM3 (ref 0.7–3.1)
LYMPHOCYTES NFR BLD AUTO: 22.8 % (ref 19.6–45.3)
MCH RBC QN AUTO: 32.1 PG (ref 26.6–33)
MCHC RBC AUTO-ENTMCNC: 32.9 G/DL (ref 31.5–35.7)
MCV RBC AUTO: 97.5 FL (ref 79–97)
MONOCYTES # BLD AUTO: 0.63 10*3/MM3 (ref 0.1–0.9)
MONOCYTES NFR BLD AUTO: 9.6 % (ref 5–12)
NEUTROPHILS NFR BLD AUTO: 4.05 10*3/MM3 (ref 1.7–7)
NEUTROPHILS NFR BLD AUTO: 61.6 % (ref 42.7–76)
NRBC BLD AUTO-RTO: 0 /100 WBC (ref 0–0.2)
PLATELET # BLD AUTO: 237 10*3/MM3 (ref 140–450)
PMV BLD AUTO: 11.6 FL (ref 6–12)
POTASSIUM SERPL-SCNC: 4.1 MMOL/L (ref 3.5–5.2)
PROT SERPL-MCNC: 7.4 G/DL (ref 6–8.5)
RBC # BLD AUTO: 4.39 10*6/MM3 (ref 4.14–5.8)
SODIUM SERPL-SCNC: 138 MMOL/L (ref 136–145)
TRIGL SERPL-MCNC: 58 MG/DL (ref 0–150)
TSH SERPL DL<=0.05 MIU/L-ACNC: 1.63 UIU/ML (ref 0.27–4.2)
VIT B12 BLD-MCNC: 447 PG/ML (ref 211–946)
VLDLC SERPL-MCNC: 12 MG/DL (ref 5–40)
WBC NRBC COR # BLD AUTO: 6.57 10*3/MM3 (ref 3.4–10.8)

## 2025-01-31 PROCEDURE — 86695 HERPES SIMPLEX TYPE 1 TEST: CPT | Performed by: FAMILY MEDICINE

## 2025-01-31 PROCEDURE — 83036 HEMOGLOBIN GLYCOSYLATED A1C: CPT | Performed by: FAMILY MEDICINE

## 2025-01-31 PROCEDURE — 82607 VITAMIN B-12: CPT | Performed by: FAMILY MEDICINE

## 2025-01-31 PROCEDURE — 86706 HEP B SURFACE ANTIBODY: CPT | Performed by: FAMILY MEDICINE

## 2025-01-31 PROCEDURE — 80074 ACUTE HEPATITIS PANEL: CPT | Performed by: FAMILY MEDICINE

## 2025-01-31 PROCEDURE — 96372 THER/PROPH/DIAG INJ SC/IM: CPT | Performed by: FAMILY MEDICINE

## 2025-01-31 PROCEDURE — 86696 HERPES SIMPLEX TYPE 2 TEST: CPT | Performed by: FAMILY MEDICINE

## 2025-01-31 PROCEDURE — 80061 LIPID PANEL: CPT | Performed by: FAMILY MEDICINE

## 2025-01-31 PROCEDURE — 85025 COMPLETE CBC W/AUTO DIFF WBC: CPT | Performed by: FAMILY MEDICINE

## 2025-01-31 PROCEDURE — 99395 PREV VISIT EST AGE 18-39: CPT | Performed by: FAMILY MEDICINE

## 2025-01-31 PROCEDURE — 1160F RVW MEDS BY RX/DR IN RCRD: CPT | Performed by: FAMILY MEDICINE

## 2025-01-31 PROCEDURE — 1126F AMNT PAIN NOTED NONE PRSNT: CPT | Performed by: FAMILY MEDICINE

## 2025-01-31 PROCEDURE — 82306 VITAMIN D 25 HYDROXY: CPT | Performed by: FAMILY MEDICINE

## 2025-01-31 PROCEDURE — 1159F MED LIST DOCD IN RCRD: CPT | Performed by: FAMILY MEDICINE

## 2025-01-31 PROCEDURE — 87661 TRICHOMONAS VAGINALIS AMPLIF: CPT | Performed by: FAMILY MEDICINE

## 2025-01-31 PROCEDURE — 80053 COMPREHEN METABOLIC PANEL: CPT | Performed by: FAMILY MEDICINE

## 2025-01-31 PROCEDURE — 84443 ASSAY THYROID STIM HORMONE: CPT | Performed by: FAMILY MEDICINE

## 2025-01-31 PROCEDURE — 87591 N.GONORRHOEAE DNA AMP PROB: CPT | Performed by: FAMILY MEDICINE

## 2025-01-31 PROCEDURE — 2014F MENTAL STATUS ASSESS: CPT | Performed by: FAMILY MEDICINE

## 2025-01-31 PROCEDURE — 86592 SYPHILIS TEST NON-TREP QUAL: CPT | Performed by: FAMILY MEDICINE

## 2025-01-31 PROCEDURE — 87491 CHLMYD TRACH DNA AMP PROBE: CPT | Performed by: FAMILY MEDICINE

## 2025-01-31 RX ORDER — KETOROLAC TROMETHAMINE 30 MG/ML
30 INJECTION, SOLUTION INTRAMUSCULAR; INTRAVENOUS ONCE
Status: COMPLETED | OUTPATIENT
Start: 2025-01-31 | End: 2025-01-31

## 2025-01-31 RX ORDER — KETOROLAC TROMETHAMINE 10 MG/1
10 TABLET, FILM COATED ORAL EVERY 6 HOURS PRN
Qty: 12 TABLET | Refills: 0 | Status: SHIPPED | OUTPATIENT
Start: 2025-01-31

## 2025-01-31 RX ADMIN — KETOROLAC TROMETHAMINE 30 MG: 30 INJECTION, SOLUTION INTRAMUSCULAR; INTRAVENOUS at 08:41

## 2025-01-31 NOTE — PROGRESS NOTES
Subjective     Chief Complaint  Annual Exam    Subjective          Willy Vu is a 29 y.o. male who presents today to [facility] for follow up.    HPI:   History of Present Illness    Mr. Vu is a very pleasant 29-year-old male presents today to have a annual physical exam.    His past medical history includes irritable bowel syndrome with constipation and diarrhea, generalized anxiety.    He is not currently taking any chronic medications. Overall, he is doing great! He has gained about 15 lbs. IBS is under control.     His only issue is dental pain, he is pending an appointment with his dentist.  Toradol helps with the discomfort.    The following portions of the patient's history were reviewed and updated as appropriate: allergies, current medications, past family history, past medical history, past social history, past surgical history and problem list.    Objective     Objective     Allergy:   No Known Allergies     Current Medications:   No current outpatient medications on file.     No current facility-administered medications for this visit.       Past Medical History:  Past Medical History:   Diagnosis Date    Anxiety     Chlamydia        Past Surgical History:  History reviewed. No pertinent surgical history.    Social History:  Social History     Socioeconomic History    Marital status: Single   Tobacco Use    Smoking status: Every Day     Current packs/day: 2.00     Average packs/day: 2.0 packs/day for 2.0 years (4.0 ttl pk-yrs)     Types: Cigars, Cigarettes     Passive exposure: Never    Smokeless tobacco: Never   Vaping Use    Vaping status: Some Days    Substances: Nicotine    Devices: Disposable   Substance and Sexual Activity    Alcohol use: Yes     Alcohol/week: 5.0 standard drinks of alcohol     Types: 3 Cans of beer, 2 Shots of liquor per week     Comment: social    Drug use: Yes     Types: Marijuana    Sexual activity: Yes     Partners:  "Male     Birth control/protection: Partner of same sex       Family History:  Family History   Problem Relation Age of Onset    Hypertension Mother     Lupus Mother     No Known Problems Father     Hypertension Sister     Asthma Brother     Allergies Brother     Eczema Brother        Vital Signs:   /80   Pulse 88   Temp 98.2 °F (36.8 °C) (Infrared)   Ht 170.2 cm (67.01\")   Wt 61.2 kg (135 lb)   BMI 21.14 kg/m²      Physical Exam:  Physical Exam  Vitals reviewed.   Constitutional:       Appearance: He is not ill-appearing.   Cardiovascular:      Rate and Rhythm: Normal rate.      Pulses: Normal pulses.   Pulmonary:      Effort: Pulmonary effort is normal.      Breath sounds: Normal breath sounds.   Neurological:      General: No focal deficit present.      Mental Status: He is alert. Mental status is at baseline.   Psychiatric:         Mood and Affect: Mood normal.         Behavior: Behavior normal.         Thought Content: Thought content normal.         Judgment: Judgment normal.               PHQ-9 Score  PHQ-9 Total Score:      Lab Review  No visits with results within 3 Month(s) from this visit.   Latest known visit with results is:   Office Visit on 10/21/2024   Component Date Value Ref Range Status    Rapid Influenza A Ag 10/21/2024 Negative  Negative Final    Rapid Influenza B Ag 10/21/2024 Negative  Negative Final    Internal Control 10/21/2024 Passed  Passed Final    Lot Number 10/21/2024 [id number]   Final    Expiration Date 10/21/2024 12/13/2025   Final    Rapid Strep A Screen 10/21/2024 Negative  Negative, VALID, INVALID, Not Performed Final    Internal Control 10/21/2024 Passed  Passed Final    Lot Number 10/21/2024 [id number]   Final    Expiration Date 10/21/2024 01/30/2026   Final    SARS Antigen 10/21/2024 Not Detected  Not Detected, Presumptive Negative Final    Internal Control 10/21/2024 Passed  Passed Final    Lot Number 10/21/2024 [id number]   Final    Expiration Date 10/21/2024 " 01/30/2026   Final        Radiology Results        Assessment / Plan         Assessment and Plan   Diagnoses and all orders for this visit:    1. Encounter for annual physical exam (Primary)  Assessment & Plan:  Annual wellness exam completed today. Health Maintenance including immunizations was updated and reflected in the chart. Yearly screening labs were ordered.     Further recommendations to be given once lab data received.     Health advice: healthy food choices with fresh fruits and vegetables, maintain sleep pattern at least 8 hours, avoid texting and distracted driving practices; wear safety belt, engage in regular exercise, maintain healthy weight, use safe sex practices, avoid alcohol and illicit drugs. Maintain immunizations that are up to date. Maintain health maintenance: Follow up with PCP if struggling with depression or anxiety. Keep regular dental and eye exams. Brush and floss teeth daily.     I suggest they take a daily multivitamin that is age-appropriate (example women's One-A-Day.)    Follow up Annually for Physical       Orders:  -     CBC & Differential; Future  -     Comprehensive Metabolic Panel; Future  -     Hemoglobin A1c; Future  -     Lipid Panel; Future  -     TSH Rfx On Abnormal To Free T4; Future  -     Vitamin B12; Future  -     Vitamin D,25-Hydroxy; Future    2. Routine screening for STI (sexually transmitted infection)  -     Hepatitis panel, acute; Future  -     HSV 1 and 2-Specific Ab, IgG; Future  -     RPR; Future  -     Chlamydia trachomatis, Neisseria gonorrhoeae, Trichomonas vaginalis, PCR - Swab, Urine, Clean Catch; Future    3. Immunity status testing  -     Hepatitis B Surface Antibody; Future    4. Skin cancer screening  -     Ambulatory Referral to Dermatology        Discussed possible differential diagnoses, testing, treatment, recommended non-pharmacological interventions, risks, warning signs to monitor for that would indicate need for follow-up in clinic or ER.  If no improvement with these regimens or you have new or worsening symptoms follow-up. Patient verbalizes understanding and agreement with plan of care. Denies further needs or concerns.     Patient was given instructions and counseling regarding her condition and for health maintenance advised.    BMI is within normal parameters. No other follow-up for BMI required.       Health Maintenance  Health Maintenance:   Health Maintenance Due   Topic Date Due    Hepatitis B (1 of 3 - 19+ 3-dose series) Never done    Pneumococcal Vaccine 0-64 (1 of 2 - PCV) Never done        Meds ordered during this visit  No orders of the defined types were placed in this encounter.      Meds stopped during this visit:  Medications Discontinued During This Encounter   Medication Reason    ketorolac (TORADOL) 10 MG tablet *Therapy completed        Visit Diagnoses    ICD-10-CM ICD-9-CM   1. Encounter for annual physical exam  Z00.00 V70.0   2. Routine screening for STI (sexually transmitted infection)  Z11.3 V74.5   3. Immunity status testing  Z01.84 V72.61   4. Skin cancer screening  Z12.83 V76.43       Patient was given instructions and counseling regarding his condition or for health maintenance advice. Please see specific information pulled into the AVS if appropriate.     Follow Up   Return in about 1 year (around 1/31/2026) for Annual.          This document has been electronically signed by Carine Talbot DO   January 31, 2025 08:18 EST    Dictated Utilizing Dragon Dictation: Part of this note may be an electronic transcription/translation of spoken language to printed text using the Dragon Dictation System.    Carine Talbot D.O.  [facility] [place]

## 2025-01-31 NOTE — ASSESSMENT & PLAN NOTE
Annual wellness exam completed today. Health Maintenance including immunizations was updated and reflected in the chart. Yearly screening labs were ordered.     Further recommendations to be given once lab data received.     Health advice: healthy food choices with fresh fruits and vegetables, maintain sleep pattern at least 8 hours, avoid texting and distracted driving practices; wear safety belt, engage in regular exercise, maintain healthy weight, use safe sex practices, avoid alcohol and illicit drugs. Maintain immunizations that are up to date. Maintain health maintenance: Follow up with PCP if struggling with depression or anxiety. Keep regular dental and eye exams. Brush and floss teeth daily.     I suggest they take a daily multivitamin that is age-appropriate (example women's One-A-Day.)    Follow up Annually for Physical

## 2025-02-01 LAB
HSV1 IGG SERPL QL IA: REACTIVE
HSV2 IGG SERPL QL IA: NON REACTIVE
RPR SER QL: NORMAL

## 2025-02-03 LAB
C TRACH RRNA SPEC QL NAA+PROBE: NEGATIVE
N GONORRHOEA RRNA SPEC QL NAA+PROBE: NEGATIVE
T VAGINALIS RRNA SPEC QL NAA+PROBE: NEGATIVE

## 2025-02-13 ENCOUNTER — APPOINTMENT (OUTPATIENT)
Facility: HOSPITAL | Age: 30
End: 2025-02-13
Payer: MEDICAID

## 2025-02-13 ENCOUNTER — HOSPITAL ENCOUNTER (EMERGENCY)
Facility: HOSPITAL | Age: 30
Discharge: HOME | End: 2025-02-13
Attending: EMERGENCY MEDICINE
Payer: MEDICAID

## 2025-02-13 VITALS
HEART RATE: 73 BPM | DIASTOLIC BLOOD PRESSURE: 79 MMHG | OXYGEN SATURATION: 95 % | HEIGHT: 67 IN | BODY MASS INDEX: 21.19 KG/M2 | TEMPERATURE: 99.3 F | RESPIRATION RATE: 18 BRPM | SYSTOLIC BLOOD PRESSURE: 117 MMHG | WEIGHT: 135 LBS

## 2025-02-13 DIAGNOSIS — J10.1 INFLUENZA A: Primary | ICD-10-CM

## 2025-02-13 LAB
BACTERIA UR QL AUTO: ABNORMAL /HPF
BILIRUB UR QL STRIP: NEGATIVE
CLARITY UR: CLEAR
COLOR UR: YELLOW
FLUAV RNA RESP QL NAA+PROBE: DETECTED
FLUBV RNA RESP QL NAA+PROBE: NOT DETECTED
GLUCOSE UR STRIP-MCNC: NEGATIVE MG/DL
HGB UR QL STRIP.AUTO: ABNORMAL
HYALINE CASTS UR QL AUTO: ABNORMAL /LPF
KETONES UR QL STRIP: ABNORMAL
LEUKOCYTE ESTERASE UR QL STRIP.AUTO: NEGATIVE
MUCOUS THREADS URNS QL MICRO: ABNORMAL /HPF
NITRITE UR QL STRIP: NEGATIVE
PH UR STRIP.AUTO: 6 [PH] (ref 5–8)
PROT UR QL STRIP: ABNORMAL
RBC # UR STRIP: ABNORMAL /HPF
REF LAB TEST METHOD: ABNORMAL
RSV RNA RESP QL NAA+PROBE: NOT DETECTED
SARS-COV-2 RNA RESP QL NAA+PROBE: NOT DETECTED
SP GR UR STRIP: 1.02 (ref 1–1.03)
SQUAMOUS #/AREA URNS HPF: ABNORMAL /HPF
UROBILINOGEN UR QL STRIP: ABNORMAL
WBC # UR STRIP: ABNORMAL /HPF

## 2025-02-13 PROCEDURE — 87637 SARSCOV2&INF A&B&RSV AMP PRB: CPT | Performed by: EMERGENCY MEDICINE

## 2025-02-13 PROCEDURE — 99283 EMERGENCY DEPT VISIT LOW MDM: CPT

## 2025-02-13 PROCEDURE — 81001 URINALYSIS AUTO W/SCOPE: CPT | Performed by: PHYSICIAN ASSISTANT

## 2025-02-13 PROCEDURE — 71045 X-RAY EXAM CHEST 1 VIEW: CPT

## 2025-02-13 RX ORDER — IBUPROFEN 200 MG
600 TABLET ORAL ONCE
Status: COMPLETED | OUTPATIENT
Start: 2025-02-13 | End: 2025-02-13

## 2025-02-13 RX ADMIN — IBUPROFEN 600 MG: 200 TABLET, FILM COATED ORAL at 20:16

## 2025-02-13 NOTE — Clinical Note
[facility] EMERGENCY DEPARTMENT [place]  [address]  [place] [address]  Phone: [phone]  Fax: [phone]    Willy Vu was seen and treated in our emergency department on 2/13/2025.  He may return to work on 02/17/2025.         Thank you for choosing [facility].    Michelle Zimmerman PA-C

## 2025-02-13 NOTE — Clinical Note
[facility] EMERGENCY DEPARTMENT [place]  [address]  [facility] [address]  Phone: [phone]  Fax: [phone]    Willy Vu was seen and treated in our emergency department on 2/13/2025.  He may return to work on 02/16/2025.         Thank you for choosing [facility].    Toña Amaya, RN

## 2025-02-14 NOTE — FSED PROVIDER NOTE
"Subjective  History of Present Illness:    This is a 29-year-old male presents with complaints of 2 days of cough, fever, general malaise and muscle aches.  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  He is also complaining of back pain worse with coughing.  Patient is also worried because his urine has been very frothy.\".  Denies any dysuria, no urinary frequency or urgency, no hematuria.  He has not taken any medications for his symptoms for the past 2 days.  Patient has no significant past medical history.      Nurses Notes reviewed and agree, including vitals, allergies, social history and prior medical history.     REVIEW OF SYSTEMS: All systems reviewed and not pertinent unless noted.  Review of Systems    Past Medical History:   Diagnosis Date    Anxiety     Chlamydia        Allergies:    Patient has no known allergies.      No past surgical history on file.      Social History     Socioeconomic History    Marital status: Single   Tobacco Use    Smoking status: Every Day     Current packs/day: 2.00     Average packs/day: 2.0 packs/day for 2.0 years (4.0 ttl pk-yrs)     Types: Cigars, Cigarettes     Passive exposure: Never    Smokeless tobacco: Never   Vaping Use    Vaping status: Some Days    Substances: Nicotine    Devices: Disposable   Substance and Sexual Activity    Alcohol use: Yes     Alcohol/week: 5.0 standard drinks of alcohol     Types: 3 Cans of beer, 2 Shots of liquor per week     Comment: social    Drug use: Yes     Types: Marijuana    Sexual activity: Yes     Partners: Male     Birth control/protection: Partner of same sex         Family History   Problem Relation Age of Onset    Hypertension Mother     Lupus Mother     No Known Problems Father     Hypertension Sister     Asthma Brother     Allergies Brother     Eczema Brother        Objective  Physical Exam:  /95   Pulse 83   Temp 100.2 °F (37.9 °C) (Oral)   Resp 18   Ht 170.2 cm (67\")   Wt " 61.2 kg (135 lb)   SpO2 97%   BMI 21.14 kg/m²      Physical Exam  Vitals and nursing note reviewed.   Constitutional:       Appearance: Normal appearance.   HENT:      Nose: Nose normal.      Mouth/Throat:      Mouth: Mucous membranes are moist.   Eyes:      Pupils: Pupils are equal, round, and reactive to light.   Cardiovascular:      Rate and Rhythm: Normal rate and regular rhythm.   Pulmonary:      Effort: Pulmonary effort is normal.      Breath sounds: Normal breath sounds.   Abdominal:      General: Abdomen is flat.      Palpations: Abdomen is soft.   Musculoskeletal:         General: Normal range of motion.      Comments: Patient has tenderness palpation of bilateral SI joints, no midline tenderness, no crepitus, no step-offs    Skin:     General: Skin is warm.   Neurological:      Mental Status: He is alert.         Procedures    ED Course:    ED Course as of 02/13/25 2123 Thu Feb 13, 2025 2034 Influenza A PCR(!): Detected [EM]      ED Course User Index  [EM] Michelle Zimmerman PA-C       Lab Results (last 24 hours)       Procedure Component Value Units Date/Time    COVID-19, FLU A/B, RSV PCR 1 HR TAT - Swab, Nasopharynx [[id number]]  (Abnormal) Collected: 02/13/25 1945    Specimen: Swab from Nasopharynx Updated: 02/13/25 2032     COVID19 Not Detected     Influenza A PCR Detected     Influenza B PCR Not Detected     RSV, PCR Not Detected    Narrative:      Fact sheet for providers: https://www.fda.gov/media/304286/download    Fact sheet for patients: https://www.fda.gov/media/853889/download    Test performed by PCR.    Urinalysis With Microscopic If Indicated (No Culture) - Urine, Clean Catch [[id number]]  (Abnormal) Collected: 02/13/25 2103    Specimen: Urine, Clean Catch Updated: 02/13/25 2116     Color, UA Yellow     Appearance, UA Clear     pH, UA 6.0     Specific Gravity, UA 1.020     Glucose, UA Negative     Ketones, UA Trace     Bilirubin, UA Negative     Blood, UA Small (1+)     Protein, UA 30 mg/dL  (1+)     Leuk Esterase, UA Negative     Nitrite, UA Negative     Urobilinogen, UA 1.0 E.U./dL    Urinalysis, Microscopic Only - Urine, Clean Catch [[id number]]  (Abnormal) Collected: 02/13/25 2103    Specimen: Urine, Clean Catch Updated: 02/13/25 2117     RBC, UA 0-2 /HPF      WBC, UA 3-5 /HPF      Bacteria, UA 2+ /HPF      Squamous Epithelial Cells, UA 0-2 /HPF      Hyaline Casts, UA None Seen /LPF      Mucus, UA Small/1+ /HPF      Methodology Manual Light Microscopy             XR Chest 1 View    Result Date: 2/13/2025  XR CHEST 1 VW Date of Exam: 2/13/2025 8:14 PM EST Indication: cough, fever Comparison: None available. Findings: Mediastinum: Cardiac silhouette appears normal Lungs: The lungs appear clear without focal consolidation appreciated. Pleura: No pleural effusion or pneumothorax. Bones and soft tissues: No acute, displaced fracture seen.     Impression: Impression: No radiographic evidence of acute cardiopulmonary abnormality. Electronically Signed: Chip Castaneda  2/13/2025 8:49 PM EST  Workstation ID: [id number]        MDM     Amount and/or Complexity of Data Reviewed  Clinical lab tests: reviewed        Initial impression of presenting illness: This is a 29-year-old male presents with complaints of cough, general malaise.  Patient found to have influenza A.  He was given ibuprofen for fever.  Patient did have 1+ protein in his urine, recommend he follow-up with his primary care provider for recheck after acute illness is over.  Discussed supportive care instructions, discussed Tylenol and ibuprofen for fever and pain..  Discussed return precautions.  Discussed findings and plan of care with the patient is agreeable to discharge.    DDX: includes but is not limited to: The, COVID-19, RSV, pneumonia, urinary tract infection    Medications   ibuprofen (ADVIL,MOTRIN) tablet 600 mg (600 mg Oral Given 2/13/25 2016)       Data interpreted: Nursing notes reviewed, vital signs reviewed.  Labs independently  interpreted by me (CBC, CMP, lipase, UA, troponin, ABG, lactic acid, procalcitonin).  Imaging independently interpreted by me (x-ray, CT scan).  EKG independently interpreted by me.  O2 saturation: 97%    Counseling: Discussed the results above with the patient regarding need for admission or discharge.  Patient understands and agrees plan of care.      -----  ED Disposition       ED Disposition   Discharge    Condition   Stable    Comment   --             Final diagnoses:   Influenza A      Your Follow-Up Providers       Go to  [facility] EMERGENCY DEPARTMENT [place].    Specialty: Emergency Medicine  Follow up details: As needed, If symptoms worsen  [address]  [place] [address] [phone]             Carine Talbot DO.    Specialty: Family Medicine  Follow up details: As needed, If symptoms worsen  [address]  [place] [address] [phone]                       Contact information for after-discharge care    Follow-up information has not been specified.                    Your medication list        CONTINUE taking these medications        Instructions Last Dose Given Next Dose Due   ketorolac 10 MG tablet  Commonly known as: TORADOL      Take 1 tablet by mouth Every 6 (Six) Hours As Needed for Moderate Pain.

## 2025-02-14 NOTE — DISCHARGE INSTRUCTIONS
Use Ibuprofen/Tylenol for fever. Return to the emergency department for any worsening symptoms. Thank you for allowing us to participate in your care today.

## 2025-04-28 ENCOUNTER — TELEPHONE (OUTPATIENT)
Dept: FAMILY MEDICINE CLINIC | Facility: CLINIC | Age: 30
End: 2025-04-28
Payer: MEDICAID

## 2025-04-28 NOTE — TELEPHONE ENCOUNTER
HUB TO RELAY    LVM. Please let patient know he can print results from My Chart under the test results. Test is over a year old though so employer may not take it.

## 2025-04-28 NOTE — TELEPHONE ENCOUNTER
"  Caller: Willy Vu \"Ravi\"    Relationship: Self    Best call back number: [phone]     What is the best time to reach you: ANYTIME    Who are you requesting to speak with (clinical staff, provider,  specific staff member): CLINICAL    Do you know the name of the person who called: PATIENT    What was the call regarding: PATIENT NEEDS A COPY OF HIS TB TEST RESULTS FOR A NEW JOB.  PLEASE CALL TO DISCUSS THE WAYS HE CAN GET A COPY OF THE TEST.    Is it okay if the provider responds through MyChart: NO    "

## 2025-08-04 ENCOUNTER — HOSPITAL ENCOUNTER (EMERGENCY)
Facility: HOSPITAL | Age: 30
Discharge: HOME | End: 2025-08-04
Attending: STUDENT IN AN ORGANIZED HEALTH CARE EDUCATION/TRAINING PROGRAM | Admitting: STUDENT IN AN ORGANIZED HEALTH CARE EDUCATION/TRAINING PROGRAM
Payer: MEDICAID

## 2025-08-04 ENCOUNTER — APPOINTMENT (OUTPATIENT)
Facility: HOSPITAL | Age: 30
End: 2025-08-04
Payer: MEDICAID

## 2025-08-04 VITALS
HEIGHT: 67 IN | DIASTOLIC BLOOD PRESSURE: 79 MMHG | RESPIRATION RATE: 18 BRPM | TEMPERATURE: 97.9 F | SYSTOLIC BLOOD PRESSURE: 152 MMHG | BODY MASS INDEX: 18.36 KG/M2 | WEIGHT: 117 LBS | HEART RATE: 54 BPM | OXYGEN SATURATION: 94 %

## 2025-08-04 DIAGNOSIS — R10.84 GENERALIZED ABDOMINAL PAIN: ICD-10-CM

## 2025-08-04 DIAGNOSIS — R11.2 CANNABINOID HYPEREMESIS SYNDROME: Primary | ICD-10-CM

## 2025-08-04 DIAGNOSIS — F12.90 CANNABINOID HYPEREMESIS SYNDROME: Primary | ICD-10-CM

## 2025-08-04 LAB
ALBUMIN SERPL-MCNC: 5 G/DL (ref 3.5–5.2)
ALBUMIN/GLOB SERPL: 1.9 G/DL
ALP SERPL-CCNC: 61 U/L (ref 39–117)
ALT SERPL W P-5'-P-CCNC: 13 U/L (ref 1–41)
AMORPH URATE CRY URNS QL MICRO: ABNORMAL /HPF
AMPHET+METHAMPHET UR QL: NEGATIVE
AMPHETAMINES UR QL: NEGATIVE
ANION GAP SERPL CALCULATED.3IONS-SCNC: 15.3 MMOL/L (ref 5–15)
AST SERPL-CCNC: 24 U/L (ref 1–40)
ATMOSPHERIC PRESS: ABNORMAL MM[HG]
B-OH-BUTYR SERPL-SCNC: 0.12 MMOL/L (ref 0.02–0.27)
BACTERIA UR QL AUTO: ABNORMAL /HPF
BARBITURATES UR QL SCN: NEGATIVE
BASE EXCESS BLDV CALC-SCNC: 0.2 MMOL/L (ref -2–2)
BASOPHILS # BLD AUTO: 0.06 10*3/MM3 (ref 0–0.2)
BASOPHILS NFR BLD AUTO: 0.7 % (ref 0–1.5)
BDY SITE: ABNORMAL
BENZODIAZ UR QL SCN: NEGATIVE
BILIRUB SERPL-MCNC: 0.5 MG/DL (ref 0–1.2)
BILIRUB UR QL STRIP: NEGATIVE
BODY TEMPERATURE: 37
BUN SERPL-MCNC: 12 MG/DL (ref 6–20)
BUN/CREAT SERPL: 14.6 (ref 7–25)
BUPRENORPHINE SERPL-MCNC: NEGATIVE NG/ML
CALCIUM SPEC-SCNC: 10.4 MG/DL (ref 8.6–10.5)
CANNABINOIDS SERPL QL: POSITIVE
CHLORIDE SERPL-SCNC: 104 MMOL/L (ref 98–107)
CLARITY UR: CLEAR
CO2 BLDA-SCNC: 23.6 MMOL/L (ref 22–33)
CO2 SERPL-SCNC: 23.7 MMOL/L (ref 22–29)
COCAINE UR QL: NEGATIVE
COHGB MFR BLD: 0.7 %
COLOR UR: YELLOW
CREAT SERPL-MCNC: 0.82 MG/DL (ref 0.76–1.27)
DEPRECATED RDW RBC AUTO: 43.5 FL (ref 37–54)
EGFRCR SERPLBLD CKD-EPI 2021: 123.5 ML/MIN/1.73
EOSINOPHIL # BLD AUTO: 0 10*3/MM3 (ref 0–0.4)
EOSINOPHIL NFR BLD AUTO: 0 % (ref 0.3–6.2)
EPAP: 0
ERYTHROCYTE [DISTWIDTH] IN BLOOD BY AUTOMATED COUNT: 12.1 % (ref 12.3–15.4)
FENTANYL UR-MCNC: NEGATIVE NG/ML
GLOBULIN UR ELPH-MCNC: 2.7 GM/DL
GLUCOSE SERPL-MCNC: 131 MG/DL (ref 65–99)
GLUCOSE UR STRIP-MCNC: NEGATIVE MG/DL
HCO3 BLDV-SCNC: 22.7 MMOL/L (ref 22–28)
HCT VFR BLD AUTO: 42 % (ref 37.5–51)
HGB BLD-MCNC: 13.9 G/DL (ref 13–17.7)
HGB BLDA-MCNC: 14.1 G/DL (ref 14–18)
HGB UR QL STRIP.AUTO: NEGATIVE
HOLD SPECIMEN: NORMAL
HYALINE CASTS UR QL AUTO: ABNORMAL /LPF
IMM GRANULOCYTES # BLD AUTO: 0.03 10*3/MM3 (ref 0–0.05)
IMM GRANULOCYTES NFR BLD AUTO: 0.4 % (ref 0–0.5)
INHALED O2 CONCENTRATION: 21 %
IPAP: 0
KETONES UR QL STRIP: NEGATIVE
LEUKOCYTE ESTERASE UR QL STRIP.AUTO: NEGATIVE
LIPASE SERPL-CCNC: 11 U/L (ref 13–60)
LYMPHOCYTES # BLD AUTO: 0.68 10*3/MM3 (ref 0.7–3.1)
LYMPHOCYTES NFR BLD AUTO: 8.5 % (ref 19.6–45.3)
MCH RBC QN AUTO: 31.7 PG (ref 26.6–33)
MCHC RBC AUTO-ENTMCNC: 33.1 G/DL (ref 31.5–35.7)
MCV RBC AUTO: 95.9 FL (ref 79–97)
METHADONE UR QL SCN: NEGATIVE
METHGB BLD QL: 0.6 %
MODALITY: ABNORMAL
MONOCYTES # BLD AUTO: 0.47 10*3/MM3 (ref 0.1–0.9)
MONOCYTES NFR BLD AUTO: 5.9 % (ref 5–12)
MUCOUS THREADS URNS QL MICRO: ABNORMAL /HPF
NEUTROPHILS NFR BLD AUTO: 6.77 10*3/MM3 (ref 1.7–7)
NEUTROPHILS NFR BLD AUTO: 84.5 % (ref 42.7–76)
NITRITE UR QL STRIP: NEGATIVE
OPIATES UR QL: NEGATIVE
OXYCODONE UR QL SCN: NEGATIVE
OXYHGB MFR BLDV: 91.5 %
PAW @ PEAK INSP FLOW SETTING VENT: 0 CMH2O
PCO2 BLDV: 30.2 MM HG (ref 41–51)
PCP UR QL SCN: NEGATIVE
PH BLDV: 7.48 PH UNITS (ref 7.31–7.41)
PH UR STRIP.AUTO: >=9 [PH] (ref 5–8)
PLATELET # BLD AUTO: 246 10*3/MM3 (ref 140–450)
PMV BLD AUTO: 10.5 FL (ref 6–12)
PO2 BLDV: 68.3 MM HG (ref 27–53)
POTASSIUM SERPL-SCNC: 3.3 MMOL/L (ref 3.5–5.2)
PROT SERPL-MCNC: 7.7 G/DL (ref 6–8.5)
PROT UR QL STRIP: ABNORMAL
RBC # BLD AUTO: 4.38 10*6/MM3 (ref 4.14–5.8)
RBC # UR STRIP: ABNORMAL /HPF
REF LAB TEST METHOD: ABNORMAL
SODIUM SERPL-SCNC: 143 MMOL/L (ref 136–145)
SP GR UR STRIP: 1.01 (ref 1–1.03)
SQUAMOUS #/AREA URNS HPF: ABNORMAL /HPF
TOTAL RATE: 0 BREATHS/MINUTE
TRICYCLICS UR QL SCN: NEGATIVE
UROBILINOGEN UR QL STRIP: ABNORMAL
WBC # UR STRIP: ABNORMAL /HPF
WBC NRBC COR # BLD AUTO: 8.01 10*3/MM3 (ref 3.4–10.8)
WHOLE BLOOD HOLD COAG: NORMAL
WHOLE BLOOD HOLD SPECIMEN: NORMAL

## 2025-08-04 PROCEDURE — 82010 KETONE BODYS QUAN: CPT | Performed by: PHYSICIAN ASSISTANT

## 2025-08-04 PROCEDURE — 80307 DRUG TEST PRSMV CHEM ANLYZR: CPT | Performed by: PHYSICIAN ASSISTANT

## 2025-08-04 PROCEDURE — 85025 COMPLETE CBC W/AUTO DIFF WBC: CPT | Performed by: STUDENT IN AN ORGANIZED HEALTH CARE EDUCATION/TRAINING PROGRAM

## 2025-08-04 PROCEDURE — 81001 URINALYSIS AUTO W/SCOPE: CPT | Performed by: STUDENT IN AN ORGANIZED HEALTH CARE EDUCATION/TRAINING PROGRAM

## 2025-08-04 PROCEDURE — 83690 ASSAY OF LIPASE: CPT | Performed by: STUDENT IN AN ORGANIZED HEALTH CARE EDUCATION/TRAINING PROGRAM

## 2025-08-04 PROCEDURE — 80053 COMPREHEN METABOLIC PANEL: CPT | Performed by: STUDENT IN AN ORGANIZED HEALTH CARE EDUCATION/TRAINING PROGRAM

## 2025-08-04 PROCEDURE — 74177 CT ABD & PELVIS W/CONTRAST: CPT

## 2025-08-04 PROCEDURE — 96374 THER/PROPH/DIAG INJ IV PUSH: CPT

## 2025-08-04 PROCEDURE — 25510000001 IOPAMIDOL 61 % SOLUTION: Performed by: STUDENT IN AN ORGANIZED HEALTH CARE EDUCATION/TRAINING PROGRAM

## 2025-08-04 PROCEDURE — 25810000003 SODIUM CHLORIDE 0.9 % SOLUTION: Performed by: PHYSICIAN ASSISTANT

## 2025-08-04 PROCEDURE — 82820 HEMOGLOBIN-OXYGEN AFFINITY: CPT

## 2025-08-04 PROCEDURE — 25010000002 METOCLOPRAMIDE PER 10 MG: Performed by: PHYSICIAN ASSISTANT

## 2025-08-04 PROCEDURE — 82805 BLOOD GASES W/O2 SATURATION: CPT

## 2025-08-04 PROCEDURE — 99285 EMERGENCY DEPT VISIT HI MDM: CPT | Performed by: STUDENT IN AN ORGANIZED HEALTH CARE EDUCATION/TRAINING PROGRAM

## 2025-08-04 RX ORDER — IOPAMIDOL 612 MG/ML
100 INJECTION, SOLUTION INTRAVASCULAR
Status: COMPLETED | OUTPATIENT
Start: 2025-08-04 | End: 2025-08-04

## 2025-08-04 RX ORDER — SODIUM CHLORIDE 9 MG/ML
10 INJECTION, SOLUTION INTRAMUSCULAR; INTRAVENOUS; SUBCUTANEOUS AS NEEDED
Status: DISCONTINUED | OUTPATIENT
Start: 2025-08-04 | End: 2025-08-04 | Stop reason: HOSPADM

## 2025-08-04 RX ORDER — METOCLOPRAMIDE HYDROCHLORIDE 5 MG/ML
10 INJECTION INTRAMUSCULAR; INTRAVENOUS ONCE
Status: COMPLETED | OUTPATIENT
Start: 2025-08-04 | End: 2025-08-04

## 2025-08-04 RX ORDER — PROMETHAZINE HYDROCHLORIDE 25 MG/1
25 SUPPOSITORY RECTAL EVERY 6 HOURS PRN
Qty: 12 SUPPOSITORY | Refills: 0 | Status: SHIPPED | OUTPATIENT
Start: 2025-08-04

## 2025-08-04 RX ADMIN — SODIUM CHLORIDE 1000 ML: 9 INJECTION, SOLUTION INTRAVENOUS at 10:23

## 2025-08-04 RX ADMIN — METOCLOPRAMIDE 10 MG: 5 INJECTION, SOLUTION INTRAMUSCULAR; INTRAVENOUS at 10:23

## 2025-08-04 RX ADMIN — IOPAMIDOL 80 ML: 612 INJECTION, SOLUTION INTRAVENOUS at 11:10
